# Patient Record
Sex: MALE | Race: WHITE | Employment: UNEMPLOYED | URBAN - METROPOLITAN AREA
[De-identification: names, ages, dates, MRNs, and addresses within clinical notes are randomized per-mention and may not be internally consistent; named-entity substitution may affect disease eponyms.]

---

## 2017-01-17 ENCOUNTER — OFFICE VISIT (OUTPATIENT)
Dept: FAMILY MEDICINE CLINIC | Age: 82
End: 2017-01-17

## 2017-01-17 VITALS
TEMPERATURE: 97.9 F | HEIGHT: 65 IN | DIASTOLIC BLOOD PRESSURE: 66 MMHG | WEIGHT: 134 LBS | SYSTOLIC BLOOD PRESSURE: 128 MMHG | BODY MASS INDEX: 22.33 KG/M2

## 2017-01-17 DIAGNOSIS — D50.8 IRON DEFICIENCY ANEMIA SECONDARY TO INADEQUATE DIETARY IRON INTAKE: ICD-10-CM

## 2017-01-17 DIAGNOSIS — R35.0 FREQUENCY OF URINATION: ICD-10-CM

## 2017-01-17 DIAGNOSIS — I10 ESSENTIAL HYPERTENSION: ICD-10-CM

## 2017-01-17 DIAGNOSIS — I10 ESSENTIAL HYPERTENSION: Primary | ICD-10-CM

## 2017-01-17 LAB
ANION GAP SERPL CALCULATED.3IONS-SCNC: 13 MMOL/L (ref 3–16)
BASOPHILS ABSOLUTE: 0 K/UL (ref 0–0.2)
BASOPHILS RELATIVE PERCENT: 1.3 %
BILIRUBIN, POC: NEGATIVE
BLOOD URINE, POC: ABNORMAL
BUN BLDV-MCNC: 39 MG/DL (ref 7–20)
CALCIUM SERPL-MCNC: 8.7 MG/DL (ref 8.3–10.6)
CHLORIDE BLD-SCNC: 107 MMOL/L (ref 99–110)
CLARITY, POC: CLEAR
CO2: 27 MMOL/L (ref 21–32)
COLOR, POC: YELLOW
CREAT SERPL-MCNC: 1.8 MG/DL (ref 0.8–1.3)
EOSINOPHILS ABSOLUTE: 0 K/UL (ref 0–0.6)
EOSINOPHILS RELATIVE PERCENT: 0.8 %
GFR AFRICAN AMERICAN: 42
GFR NON-AFRICAN AMERICAN: 35
GLUCOSE BLD-MCNC: 93 MG/DL (ref 70–99)
GLUCOSE URINE, POC: NEGATIVE
HCT VFR BLD CALC: 31.5 % (ref 40.5–52.5)
HEMOGLOBIN: 10.3 G/DL (ref 13.5–17.5)
KETONES, POC: NEGATIVE
LEUKOCYTE EST, POC: NEGATIVE
LYMPHOCYTES ABSOLUTE: 0.6 K/UL (ref 1–5.1)
LYMPHOCYTES RELATIVE PERCENT: 17.7 %
MCH RBC QN AUTO: 30.8 PG (ref 26–34)
MCHC RBC AUTO-ENTMCNC: 32.6 G/DL (ref 31–36)
MCV RBC AUTO: 94.4 FL (ref 80–100)
MONOCYTES ABSOLUTE: 0.2 K/UL (ref 0–1.3)
MONOCYTES RELATIVE PERCENT: 6.7 %
NEUTROPHILS ABSOLUTE: 2.7 K/UL (ref 1.7–7.7)
NEUTROPHILS RELATIVE PERCENT: 73.5 %
NITRITE, POC: NEGATIVE
PDW BLD-RTO: 15.4 % (ref 12.4–15.4)
PH, POC: 6.5
PLATELET # BLD: 139 K/UL (ref 135–450)
PMV BLD AUTO: 10.1 FL (ref 5–10.5)
POTASSIUM SERPL-SCNC: 4.6 MMOL/L (ref 3.5–5.1)
PROTEIN, POC: ABNORMAL
RBC # BLD: 3.33 M/UL (ref 4.2–5.9)
SODIUM BLD-SCNC: 147 MMOL/L (ref 136–145)
SPECIFIC GRAVITY, POC: 1.02
UROBILINOGEN, POC: 0.2
WBC # BLD: 3.6 K/UL (ref 4–11)

## 2017-01-17 PROCEDURE — G8484 FLU IMMUNIZE NO ADMIN: HCPCS | Performed by: INTERNAL MEDICINE

## 2017-01-17 PROCEDURE — 99214 OFFICE O/P EST MOD 30 MIN: CPT | Performed by: INTERNAL MEDICINE

## 2017-01-17 PROCEDURE — 1123F ACP DISCUSS/DSCN MKR DOCD: CPT | Performed by: INTERNAL MEDICINE

## 2017-01-17 PROCEDURE — 4040F PNEUMOC VAC/ADMIN/RCVD: CPT | Performed by: INTERNAL MEDICINE

## 2017-01-17 PROCEDURE — G8598 ASA/ANTIPLAT THER USED: HCPCS | Performed by: INTERNAL MEDICINE

## 2017-01-17 PROCEDURE — 81002 URINALYSIS NONAUTO W/O SCOPE: CPT | Performed by: INTERNAL MEDICINE

## 2017-01-17 PROCEDURE — G8427 DOCREV CUR MEDS BY ELIG CLIN: HCPCS | Performed by: INTERNAL MEDICINE

## 2017-01-17 PROCEDURE — G8419 CALC BMI OUT NRM PARAM NOF/U: HCPCS | Performed by: INTERNAL MEDICINE

## 2017-01-17 PROCEDURE — 1036F TOBACCO NON-USER: CPT | Performed by: INTERNAL MEDICINE

## 2017-01-17 ASSESSMENT — ENCOUNTER SYMPTOMS
EYE REDNESS: 1
SHORTNESS OF BREATH: 0
DIARRHEA: 0
BLOOD IN STOOL: 0
WHEEZING: 0
APNEA: 0
COUGH: 0
CONSTIPATION: 0
ABDOMINAL PAIN: 0

## 2017-01-30 RX ORDER — TAMSULOSIN HYDROCHLORIDE 0.4 MG/1
CAPSULE ORAL
Qty: 90 CAPSULE | Refills: 0 | Status: SHIPPED | OUTPATIENT
Start: 2017-01-30 | End: 2017-05-01 | Stop reason: SDUPTHER

## 2017-01-30 RX ORDER — SIMVASTATIN 5 MG
TABLET ORAL
Qty: 90 TABLET | Refills: 0 | Status: SHIPPED | OUTPATIENT
Start: 2017-01-30 | End: 2017-05-01 | Stop reason: SDUPTHER

## 2017-02-03 ENCOUNTER — TELEPHONE (OUTPATIENT)
Dept: FAMILY MEDICINE CLINIC | Age: 82
End: 2017-02-03

## 2017-03-28 ENCOUNTER — TELEPHONE (OUTPATIENT)
Dept: FAMILY MEDICINE CLINIC | Age: 82
End: 2017-03-28

## 2017-04-07 ENCOUNTER — TELEPHONE (OUTPATIENT)
Dept: FAMILY MEDICINE CLINIC | Age: 82
End: 2017-04-07

## 2017-04-07 ENCOUNTER — OFFICE VISIT (OUTPATIENT)
Dept: FAMILY MEDICINE CLINIC | Age: 82
End: 2017-04-07

## 2017-04-07 ENCOUNTER — HOSPITAL ENCOUNTER (OUTPATIENT)
Dept: NON INVASIVE DIAGNOSTICS | Age: 82
Discharge: HOME | End: 2017-04-07
Attending: INTERNAL MEDICINE | Admitting: INTERNAL MEDICINE

## 2017-04-07 VITALS
SYSTOLIC BLOOD PRESSURE: 130 MMHG | WEIGHT: 134 LBS | DIASTOLIC BLOOD PRESSURE: 64 MMHG | HEIGHT: 65 IN | TEMPERATURE: 97.7 F | OXYGEN SATURATION: 97 % | BODY MASS INDEX: 22.33 KG/M2

## 2017-04-07 DIAGNOSIS — M54.50 ACUTE MIDLINE LOW BACK PAIN WITHOUT SCIATICA: ICD-10-CM

## 2017-04-07 DIAGNOSIS — I10 ESSENTIAL HYPERTENSION: Primary | ICD-10-CM

## 2017-04-07 DIAGNOSIS — E78.00 PURE HYPERCHOLESTEROLEMIA: ICD-10-CM

## 2017-04-07 PROBLEM — M54.9 BACK PAIN: Status: ACTIVE | Noted: 2017-04-07

## 2017-04-07 PROCEDURE — G8419 CALC BMI OUT NRM PARAM NOF/U: HCPCS | Performed by: INTERNAL MEDICINE

## 2017-04-07 PROCEDURE — G8427 DOCREV CUR MEDS BY ELIG CLIN: HCPCS | Performed by: INTERNAL MEDICINE

## 2017-04-07 PROCEDURE — 1036F TOBACCO NON-USER: CPT | Performed by: INTERNAL MEDICINE

## 2017-04-07 PROCEDURE — G8598 ASA/ANTIPLAT THER USED: HCPCS | Performed by: INTERNAL MEDICINE

## 2017-04-07 PROCEDURE — 99214 OFFICE O/P EST MOD 30 MIN: CPT | Performed by: INTERNAL MEDICINE

## 2017-04-07 PROCEDURE — 4040F PNEUMOC VAC/ADMIN/RCVD: CPT | Performed by: INTERNAL MEDICINE

## 2017-04-07 PROCEDURE — 1123F ACP DISCUSS/DSCN MKR DOCD: CPT | Performed by: INTERNAL MEDICINE

## 2017-04-07 ASSESSMENT — ENCOUNTER SYMPTOMS
CONSTIPATION: 0
COUGH: 0
ABDOMINAL PAIN: 0
SINUS PRESSURE: 0
BACK PAIN: 1
NAUSEA: 0
VOMITING: 0
DIARRHEA: 0
SHORTNESS OF BREATH: 0
APNEA: 0

## 2017-04-10 RX ORDER — TRAMADOL HYDROCHLORIDE 50 MG/1
50 TABLET ORAL EVERY 6 HOURS PRN
Qty: 24 TABLET | Refills: 0 | Status: SHIPPED | OUTPATIENT
Start: 2017-04-10 | End: 2017-04-20

## 2017-05-02 RX ORDER — SIMVASTATIN 5 MG
TABLET ORAL
Qty: 90 TABLET | Refills: 0 | Status: SHIPPED | OUTPATIENT
Start: 2017-05-02 | End: 2017-07-25 | Stop reason: SDUPTHER

## 2017-05-02 RX ORDER — TAMSULOSIN HYDROCHLORIDE 0.4 MG/1
CAPSULE ORAL
Qty: 90 CAPSULE | Refills: 0 | Status: SHIPPED | OUTPATIENT
Start: 2017-05-02 | End: 2017-07-31 | Stop reason: SDUPTHER

## 2017-07-25 RX ORDER — SIMVASTATIN 5 MG
TABLET ORAL
Qty: 90 TABLET | Refills: 0 | Status: SHIPPED | OUTPATIENT
Start: 2017-07-25 | End: 2017-10-20 | Stop reason: SDUPTHER

## 2017-07-31 RX ORDER — TAMSULOSIN HYDROCHLORIDE 0.4 MG/1
CAPSULE ORAL
Qty: 90 CAPSULE | Refills: 0 | Status: SHIPPED | OUTPATIENT
Start: 2017-07-31 | End: 2017-10-25 | Stop reason: SDUPTHER

## 2017-09-13 ENCOUNTER — OFFICE VISIT (OUTPATIENT)
Dept: FAMILY MEDICINE CLINIC | Age: 82
End: 2017-09-13

## 2017-09-13 VITALS
BODY MASS INDEX: 22.33 KG/M2 | SYSTOLIC BLOOD PRESSURE: 122 MMHG | HEIGHT: 65 IN | TEMPERATURE: 98.3 F | DIASTOLIC BLOOD PRESSURE: 72 MMHG | WEIGHT: 134 LBS

## 2017-09-13 DIAGNOSIS — Z23 FLU VACCINE NEED: ICD-10-CM

## 2017-09-13 DIAGNOSIS — M54.2 NECK PAIN: Primary | ICD-10-CM

## 2017-09-13 PROCEDURE — 4040F PNEUMOC VAC/ADMIN/RCVD: CPT | Performed by: INTERNAL MEDICINE

## 2017-09-13 PROCEDURE — G0008 ADMIN INFLUENZA VIRUS VAC: HCPCS | Performed by: INTERNAL MEDICINE

## 2017-09-13 PROCEDURE — 1123F ACP DISCUSS/DSCN MKR DOCD: CPT | Performed by: INTERNAL MEDICINE

## 2017-09-13 PROCEDURE — G8420 CALC BMI NORM PARAMETERS: HCPCS | Performed by: INTERNAL MEDICINE

## 2017-09-13 PROCEDURE — 90662 IIV NO PRSV INCREASED AG IM: CPT | Performed by: INTERNAL MEDICINE

## 2017-09-13 PROCEDURE — 99213 OFFICE O/P EST LOW 20 MIN: CPT | Performed by: INTERNAL MEDICINE

## 2017-09-13 PROCEDURE — G8427 DOCREV CUR MEDS BY ELIG CLIN: HCPCS | Performed by: INTERNAL MEDICINE

## 2017-09-13 PROCEDURE — G8598 ASA/ANTIPLAT THER USED: HCPCS | Performed by: INTERNAL MEDICINE

## 2017-09-13 PROCEDURE — 1036F TOBACCO NON-USER: CPT | Performed by: INTERNAL MEDICINE

## 2017-09-13 ASSESSMENT — PATIENT HEALTH QUESTIONNAIRE - PHQ9
1. LITTLE INTEREST OR PLEASURE IN DOING THINGS: 0
SUM OF ALL RESPONSES TO PHQ9 QUESTIONS 1 & 2: 0
2. FEELING DOWN, DEPRESSED OR HOPELESS: 0
SUM OF ALL RESPONSES TO PHQ QUESTIONS 1-9: 0

## 2017-09-13 ASSESSMENT — ENCOUNTER SYMPTOMS
SHORTNESS OF BREATH: 0
WHEEZING: 0
APNEA: 0
ABDOMINAL PAIN: 0
COUGH: 0

## 2017-10-23 RX ORDER — SIMVASTATIN 5 MG
TABLET ORAL
Qty: 90 TABLET | Refills: 0 | Status: SHIPPED | OUTPATIENT
Start: 2017-10-23 | End: 2018-01-29 | Stop reason: SDUPTHER

## 2017-10-27 RX ORDER — TAMSULOSIN HYDROCHLORIDE 0.4 MG/1
CAPSULE ORAL
Qty: 90 CAPSULE | Refills: 0 | Status: SHIPPED | OUTPATIENT
Start: 2017-10-27 | End: 2018-01-29 | Stop reason: SDUPTHER

## 2018-01-30 RX ORDER — SIMVASTATIN 5 MG
TABLET ORAL
Qty: 90 TABLET | Refills: 0 | Status: SHIPPED | OUTPATIENT
Start: 2018-01-30 | End: 2018-04-27 | Stop reason: SDUPTHER

## 2018-01-30 RX ORDER — TAMSULOSIN HYDROCHLORIDE 0.4 MG/1
CAPSULE ORAL
Qty: 90 CAPSULE | Refills: 0 | Status: SHIPPED | OUTPATIENT
Start: 2018-01-30 | End: 2018-04-27 | Stop reason: SDUPTHER

## 2018-04-27 RX ORDER — TAMSULOSIN HYDROCHLORIDE 0.4 MG/1
CAPSULE ORAL
Qty: 90 CAPSULE | Refills: 0 | Status: SHIPPED | OUTPATIENT
Start: 2018-04-27 | End: 2018-07-26 | Stop reason: SDUPTHER

## 2018-04-30 RX ORDER — SIMVASTATIN 5 MG
TABLET ORAL
Qty: 90 TABLET | Refills: 0 | Status: SHIPPED | OUTPATIENT
Start: 2018-04-30 | End: 2018-07-26 | Stop reason: SDUPTHER

## 2018-07-27 RX ORDER — TAMSULOSIN HYDROCHLORIDE 0.4 MG/1
CAPSULE ORAL
Qty: 90 CAPSULE | Refills: 0 | Status: SHIPPED | OUTPATIENT
Start: 2018-07-27 | End: 2018-10-22 | Stop reason: SDUPTHER

## 2018-07-27 RX ORDER — TAMSULOSIN HYDROCHLORIDE 0.4 MG/1
CAPSULE ORAL
Qty: 90 CAPSULE | Refills: 0 | Status: SHIPPED | OUTPATIENT
Start: 2018-07-27 | End: 2018-08-03 | Stop reason: SDUPTHER

## 2018-07-27 RX ORDER — SIMVASTATIN 5 MG
TABLET ORAL
Qty: 90 TABLET | Refills: 0 | Status: ON HOLD | OUTPATIENT
Start: 2018-07-27 | End: 2019-06-04

## 2018-08-03 ENCOUNTER — OFFICE VISIT (OUTPATIENT)
Dept: FAMILY MEDICINE CLINIC | Age: 83
End: 2018-08-03

## 2018-08-03 VITALS
SYSTOLIC BLOOD PRESSURE: 130 MMHG | HEIGHT: 65 IN | BODY MASS INDEX: 23.32 KG/M2 | TEMPERATURE: 97 F | WEIGHT: 140 LBS | DIASTOLIC BLOOD PRESSURE: 80 MMHG

## 2018-08-03 DIAGNOSIS — J18.9 PNEUMONIA DUE TO INFECTIOUS ORGANISM, UNSPECIFIED LATERALITY, UNSPECIFIED PART OF LUNG: ICD-10-CM

## 2018-08-03 PROCEDURE — G8598 ASA/ANTIPLAT THER USED: HCPCS | Performed by: INTERNAL MEDICINE

## 2018-08-03 PROCEDURE — G8420 CALC BMI NORM PARAMETERS: HCPCS | Performed by: INTERNAL MEDICINE

## 2018-08-03 PROCEDURE — 1036F TOBACCO NON-USER: CPT | Performed by: INTERNAL MEDICINE

## 2018-08-03 PROCEDURE — 1101F PT FALLS ASSESS-DOCD LE1/YR: CPT | Performed by: INTERNAL MEDICINE

## 2018-08-03 PROCEDURE — 99213 OFFICE O/P EST LOW 20 MIN: CPT | Performed by: INTERNAL MEDICINE

## 2018-08-03 PROCEDURE — 4040F PNEUMOC VAC/ADMIN/RCVD: CPT | Performed by: INTERNAL MEDICINE

## 2018-08-03 PROCEDURE — G8427 DOCREV CUR MEDS BY ELIG CLIN: HCPCS | Performed by: INTERNAL MEDICINE

## 2018-08-03 PROCEDURE — 1123F ACP DISCUSS/DSCN MKR DOCD: CPT | Performed by: INTERNAL MEDICINE

## 2018-08-03 RX ORDER — AMOXICILLIN AND CLAVULANATE POTASSIUM 500; 125 MG/1; MG/1
1 TABLET, FILM COATED ORAL 3 TIMES DAILY
COMMUNITY
End: 2019-01-08 | Stop reason: ALTCHOICE

## 2018-08-03 ASSESSMENT — ENCOUNTER SYMPTOMS
SINUS PRESSURE: 0
APNEA: 0
COUGH: 0
RHINORRHEA: 0
ABDOMINAL PAIN: 0
SHORTNESS OF BREATH: 0
SINUS PAIN: 0

## 2018-08-03 NOTE — PATIENT INSTRUCTIONS
Please call your pharmacy if you need any refills of your medication(s). Please call our office at [phone] if you don't hear from us about your test results. Please be sure to call our office if your illness/problem has been treated for but has not completely resolved. Bring an accurate list of your medications with you at every appointment to ensure that we have the correct information. Our office hours are: Monday - Friday 7 am- 5 pm; Saturdays vary on doctor working.     Phone lines turn on at 8 am.

## 2018-08-30 ENCOUNTER — TELEPHONE (OUTPATIENT)
Dept: FAMILY MEDICINE CLINIC | Age: 83
End: 2018-08-30

## 2018-08-30 DIAGNOSIS — J18.9 PNEUMONIA DUE TO INFECTIOUS ORGANISM, UNSPECIFIED LATERALITY, UNSPECIFIED PART OF LUNG: Primary | ICD-10-CM

## 2018-10-22 RX ORDER — TAMSULOSIN HYDROCHLORIDE 0.4 MG/1
CAPSULE ORAL
Qty: 90 CAPSULE | Refills: 0 | Status: SHIPPED | OUTPATIENT
Start: 2018-10-22

## 2019-01-08 ENCOUNTER — OFFICE VISIT (OUTPATIENT)
Dept: FAMILY MEDICINE CLINIC | Age: 84
End: 2019-01-08
Payer: MEDICARE

## 2019-01-08 VITALS
SYSTOLIC BLOOD PRESSURE: 120 MMHG | TEMPERATURE: 98 F | HEIGHT: 65 IN | BODY MASS INDEX: 20.83 KG/M2 | WEIGHT: 125 LBS | DIASTOLIC BLOOD PRESSURE: 64 MMHG

## 2019-01-08 DIAGNOSIS — B96.89 ACUTE BACTERIAL SINUSITIS: ICD-10-CM

## 2019-01-08 DIAGNOSIS — J01.90 ACUTE BACTERIAL SINUSITIS: ICD-10-CM

## 2019-01-08 PROCEDURE — G8598 ASA/ANTIPLAT THER USED: HCPCS | Performed by: INTERNAL MEDICINE

## 2019-01-08 PROCEDURE — 1123F ACP DISCUSS/DSCN MKR DOCD: CPT | Performed by: INTERNAL MEDICINE

## 2019-01-08 PROCEDURE — 1036F TOBACCO NON-USER: CPT | Performed by: INTERNAL MEDICINE

## 2019-01-08 PROCEDURE — 4040F PNEUMOC VAC/ADMIN/RCVD: CPT | Performed by: INTERNAL MEDICINE

## 2019-01-08 PROCEDURE — G8427 DOCREV CUR MEDS BY ELIG CLIN: HCPCS | Performed by: INTERNAL MEDICINE

## 2019-01-08 PROCEDURE — G8484 FLU IMMUNIZE NO ADMIN: HCPCS | Performed by: INTERNAL MEDICINE

## 2019-01-08 PROCEDURE — G8420 CALC BMI NORM PARAMETERS: HCPCS | Performed by: INTERNAL MEDICINE

## 2019-01-08 PROCEDURE — 1101F PT FALLS ASSESS-DOCD LE1/YR: CPT | Performed by: INTERNAL MEDICINE

## 2019-01-08 PROCEDURE — 99213 OFFICE O/P EST LOW 20 MIN: CPT | Performed by: INTERNAL MEDICINE

## 2019-01-08 RX ORDER — CEFUROXIME AXETIL 250 MG/1
250 TABLET ORAL 2 TIMES DAILY
Qty: 20 TABLET | Refills: 0 | Status: SHIPPED | OUTPATIENT
Start: 2019-01-08 | End: 2019-01-18

## 2019-01-08 ASSESSMENT — ENCOUNTER SYMPTOMS
WHEEZING: 1
ABDOMINAL PAIN: 0
RHINORRHEA: 1
SINUS PRESSURE: 1
COUGH: 1
SINUS PAIN: 1
SHORTNESS OF BREATH: 0

## 2019-01-08 ASSESSMENT — PATIENT HEALTH QUESTIONNAIRE - PHQ9
2. FEELING DOWN, DEPRESSED OR HOPELESS: 0
1. LITTLE INTEREST OR PLEASURE IN DOING THINGS: 0
SUM OF ALL RESPONSES TO PHQ9 QUESTIONS 1 & 2: 0
SUM OF ALL RESPONSES TO PHQ QUESTIONS 1-9: 0
SUM OF ALL RESPONSES TO PHQ QUESTIONS 1-9: 0

## 2019-01-14 RX ORDER — TAMSULOSIN HYDROCHLORIDE 0.4 MG/1
CAPSULE ORAL
Qty: 90 CAPSULE | Refills: 0 | Status: SHIPPED | OUTPATIENT
Start: 2019-01-14 | End: 2019-04-11 | Stop reason: SDUPTHER

## 2019-04-12 RX ORDER — TAMSULOSIN HYDROCHLORIDE 0.4 MG/1
CAPSULE ORAL
Qty: 90 CAPSULE | Refills: 0 | Status: SHIPPED | OUTPATIENT
Start: 2019-04-12 | End: 2019-04-25 | Stop reason: SDUPTHER

## 2019-04-25 ENCOUNTER — OFFICE VISIT (OUTPATIENT)
Dept: FAMILY MEDICINE CLINIC | Age: 84
End: 2019-04-25
Payer: MEDICARE

## 2019-04-25 VITALS
DIASTOLIC BLOOD PRESSURE: 60 MMHG | HEIGHT: 65 IN | TEMPERATURE: 98.4 F | BODY MASS INDEX: 20.8 KG/M2 | SYSTOLIC BLOOD PRESSURE: 126 MMHG

## 2019-04-25 DIAGNOSIS — J06.9 ACUTE UPPER RESPIRATORY INFECTION, UNSPECIFIED: Primary | ICD-10-CM

## 2019-04-25 PROCEDURE — 4040F PNEUMOC VAC/ADMIN/RCVD: CPT | Performed by: FAMILY MEDICINE

## 2019-04-25 PROCEDURE — G8420 CALC BMI NORM PARAMETERS: HCPCS | Performed by: FAMILY MEDICINE

## 2019-04-25 PROCEDURE — G8598 ASA/ANTIPLAT THER USED: HCPCS | Performed by: FAMILY MEDICINE

## 2019-04-25 PROCEDURE — 1123F ACP DISCUSS/DSCN MKR DOCD: CPT | Performed by: FAMILY MEDICINE

## 2019-04-25 PROCEDURE — G8427 DOCREV CUR MEDS BY ELIG CLIN: HCPCS | Performed by: FAMILY MEDICINE

## 2019-04-25 PROCEDURE — 99213 OFFICE O/P EST LOW 20 MIN: CPT | Performed by: FAMILY MEDICINE

## 2019-04-25 PROCEDURE — 1036F TOBACCO NON-USER: CPT | Performed by: FAMILY MEDICINE

## 2019-04-25 RX ORDER — LEVOFLOXACIN 250 MG/1
250 TABLET ORAL DAILY
Qty: 10 TABLET | Refills: 0 | Status: SHIPPED | OUTPATIENT
Start: 2019-04-25 | End: 2019-05-05

## 2019-04-25 NOTE — PROGRESS NOTES
Subjective:      Patient ID: Lupe Romero is a 80 y.o. male. Patient presents with:  Congestion: cough - yellow phlegm, rattling x 5 days    Cough productive above   No sore throat  No fever  No one else sick  Lives with family    YOB: 1919    Date of Visit:  4/25/2019    No Known Allergies    Current Outpatient Medications:  albuterol-ipratropium (COMBIVENT RESPIMAT)  MCG/ACT AERS inhaler, Inhale 1 puff into the lungs every 6 hours, Disp: 1 Inhaler, Rfl: 5  tamsulosin (FLOMAX) 0.4 MG capsule, TAKE 1 CAPSULE BY MOUTH EVERY DAY, Disp: 90 capsule, Rfl: 0  simvastatin (ZOCOR) 5 MG tablet, TAKE 1 TABLET BY MOUTH EVERY EVENING, Disp: 90 tablet, Rfl: 0  aspirin 81 MG tablet, Take 2 by mouth daily, Disp: , Rfl:   atenolol (TENORMIN) 25 MG tablet, Take 25 mg by mouth daily. , Disp: , Rfl:   bimatoprost (LUMIGAN) 0.03 % ophthalmic drops, 1 drop nightly.  , Disp: , Rfl:   Multiple Vitamin (MULTIVITAMIN PO), Take  by mouth daily. , Disp: , Rfl:   nitroGLYCERIN (NITROSTAT) 0.4 MG SL tablet, Place 0.4 mg under the tongue every 5 minutes as needed. , Disp: , Rfl:     No current facility-administered medications for this visit.       ---------------------------               04/25/19                      1458         ---------------------------   BP:          126/60         Site:    Left Upper Arm     Position:     Sitting        Cuff Size:   Large Adult      Temp:   98.4 °F (36.9 °C)   TempSrc:      Oral          Height:  5' 5\" (1.651 m)   ---------------------------  Body mass index is 20.8 kg/m². Wt Readings from Last 3 Encounters:  01/08/19 : 125 lb (56.7 kg)  08/03/18 : 140 lb (63.5 kg)  09/13/17 : 134 lb (60.8 kg)    BP Readings from Last 3 Encounters:  04/25/19 : 126/60  01/08/19 : 120/64  08/03/18 : 130/80            Review of Systems    Objective:   Physical Exam   Constitutional: He appears well-developed and well-nourished. No distress.    HENT:   Head:

## 2019-04-25 NOTE — PATIENT INSTRUCTIONS
Use the antibiotic  Call if not better  If he is feeling very well at 7 days then no need to take the full 10 days

## 2019-06-04 ENCOUNTER — HOSPITAL ENCOUNTER (INPATIENT)
Age: 84
LOS: 2 days | Discharge: HOSPICE | DRG: 436 | End: 2019-06-06
Attending: EMERGENCY MEDICINE | Admitting: INTERNAL MEDICINE
Payer: MEDICARE

## 2019-06-04 ENCOUNTER — APPOINTMENT (OUTPATIENT)
Dept: CT IMAGING | Age: 84
DRG: 436 | End: 2019-06-04
Payer: MEDICARE

## 2019-06-04 DIAGNOSIS — R11.2 NAUSEA VOMITING AND DIARRHEA: Primary | ICD-10-CM

## 2019-06-04 DIAGNOSIS — R19.7 NAUSEA VOMITING AND DIARRHEA: Primary | ICD-10-CM

## 2019-06-04 DIAGNOSIS — Z51.5 HOSPICE CARE: ICD-10-CM

## 2019-06-04 PROBLEM — N17.9 ACUTE RENAL FAILURE SUPERIMPOSED ON STAGE 3 CHRONIC KIDNEY DISEASE (HCC): Status: ACTIVE | Noted: 2019-06-04

## 2019-06-04 PROBLEM — K63.9 BOWEL WALL THICKENING: Status: ACTIVE | Noted: 2019-06-04

## 2019-06-04 PROBLEM — N18.30 ACUTE RENAL FAILURE SUPERIMPOSED ON STAGE 3 CHRONIC KIDNEY DISEASE (HCC): Status: ACTIVE | Noted: 2019-06-04

## 2019-06-04 PROBLEM — R77.8 ELEVATED TROPONIN: Status: ACTIVE | Noted: 2019-06-04

## 2019-06-04 PROBLEM — K83.8 PNEUMOBILIA: Status: ACTIVE | Noted: 2019-06-04

## 2019-06-04 PROBLEM — R19.00 ABDOMINAL MASS: Status: ACTIVE | Noted: 2019-06-04

## 2019-06-04 PROBLEM — R79.89 ELEVATED LACTIC ACID LEVEL: Status: ACTIVE | Noted: 2019-06-04

## 2019-06-04 LAB
A/G RATIO: 1 (ref 1.1–2.2)
ALBUMIN SERPL-MCNC: 3.4 G/DL (ref 3.4–5)
ALP BLD-CCNC: 67 U/L (ref 40–129)
ALT SERPL-CCNC: 15 U/L (ref 10–40)
ANION GAP SERPL CALCULATED.3IONS-SCNC: 16 MMOL/L (ref 3–16)
APTT: 32.6 SEC (ref 26–36)
AST SERPL-CCNC: 21 U/L (ref 15–37)
BASOPHILS ABSOLUTE: 0 K/UL (ref 0–0.2)
BASOPHILS RELATIVE PERCENT: 0.6 %
BILIRUB SERPL-MCNC: 0.8 MG/DL (ref 0–1)
BILIRUBIN URINE: NEGATIVE
BLOOD, URINE: NEGATIVE
BUN BLDV-MCNC: 35 MG/DL (ref 7–20)
CALCIUM SERPL-MCNC: 9 MG/DL (ref 8.3–10.6)
CHLORIDE BLD-SCNC: 105 MMOL/L (ref 99–110)
CLARITY: CLEAR
CO2: 21 MMOL/L (ref 21–32)
COLOR: YELLOW
CREAT SERPL-MCNC: 2 MG/DL (ref 0.8–1.3)
EKG ATRIAL RATE: 65 BPM
EKG DIAGNOSIS: NORMAL
EKG P AXIS: 73 DEGREES
EKG P-R INTERVAL: 148 MS
EKG Q-T INTERVAL: 462 MS
EKG QRS DURATION: 120 MS
EKG QTC CALCULATION (BAZETT): 480 MS
EKG R AXIS: -23 DEGREES
EKG T AXIS: -17 DEGREES
EKG VENTRICULAR RATE: 65 BPM
EOSINOPHILS ABSOLUTE: 0 K/UL (ref 0–0.6)
EOSINOPHILS RELATIVE PERCENT: 0.9 %
EPITHELIAL CELLS, UA: 0 /HPF (ref 0–5)
GFR AFRICAN AMERICAN: 37
GFR NON-AFRICAN AMERICAN: 31
GLOBULIN: 3.4 G/DL
GLUCOSE BLD-MCNC: 174 MG/DL (ref 70–99)
GLUCOSE URINE: NEGATIVE MG/DL
HCT VFR BLD CALC: 29.9 % (ref 40.5–52.5)
HEMOGLOBIN: 9.7 G/DL (ref 13.5–17.5)
HYALINE CASTS: 2 /LPF (ref 0–8)
INR BLD: 1.04 (ref 0.86–1.14)
KETONES, URINE: NEGATIVE MG/DL
LACTIC ACID: 2.9 MMOL/L (ref 0.4–2)
LEUKOCYTE ESTERASE, URINE: NEGATIVE
LIPASE: 13 U/L (ref 13–60)
LYMPHOCYTES ABSOLUTE: 0.4 K/UL (ref 1–5.1)
LYMPHOCYTES RELATIVE PERCENT: 9.4 %
MCH RBC QN AUTO: 30.2 PG (ref 26–34)
MCHC RBC AUTO-ENTMCNC: 32.3 G/DL (ref 31–36)
MCV RBC AUTO: 93.5 FL (ref 80–100)
MICROSCOPIC EXAMINATION: YES
MONOCYTES ABSOLUTE: 0.3 K/UL (ref 0–1.3)
MONOCYTES RELATIVE PERCENT: 7.7 %
NEUTROPHILS ABSOLUTE: 3.5 K/UL (ref 1.7–7.7)
NEUTROPHILS RELATIVE PERCENT: 81.4 %
NITRITE, URINE: NEGATIVE
PDW BLD-RTO: 17.2 % (ref 12.4–15.4)
PH UA: 5.5 (ref 5–8)
PLATELET # BLD: 123 K/UL (ref 135–450)
PMV BLD AUTO: 8.8 FL (ref 5–10.5)
POTASSIUM SERPL-SCNC: 4.3 MMOL/L (ref 3.5–5.1)
PROCALCITONIN: 0.1 NG/ML (ref 0–0.15)
PROTEIN UA: ABNORMAL MG/DL
PROTHROMBIN TIME: 11.8 SEC (ref 9.8–13)
RBC # BLD: 3.19 M/UL (ref 4.2–5.9)
RBC UA: 2 /HPF (ref 0–4)
SODIUM BLD-SCNC: 142 MMOL/L (ref 136–145)
SPECIFIC GRAVITY UA: 1.02 (ref 1–1.03)
TOTAL PROTEIN: 6.8 G/DL (ref 6.4–8.2)
TROPONIN: 0.04 NG/ML
URINE REFLEX TO CULTURE: ABNORMAL
URINE TYPE: ABNORMAL
UROBILINOGEN, URINE: 0.2 E.U./DL
WBC # BLD: 4.3 K/UL (ref 4–11)
WBC UA: 1 /HPF (ref 0–5)

## 2019-06-04 PROCEDURE — 83605 ASSAY OF LACTIC ACID: CPT

## 2019-06-04 PROCEDURE — 84145 PROCALCITONIN (PCT): CPT

## 2019-06-04 PROCEDURE — 81001 URINALYSIS AUTO W/SCOPE: CPT

## 2019-06-04 PROCEDURE — 86301 IMMUNOASSAY TUMOR CA 19-9: CPT

## 2019-06-04 PROCEDURE — 82378 CARCINOEMBRYONIC ANTIGEN: CPT

## 2019-06-04 PROCEDURE — 96360 HYDRATION IV INFUSION INIT: CPT

## 2019-06-04 PROCEDURE — 94640 AIRWAY INHALATION TREATMENT: CPT

## 2019-06-04 PROCEDURE — 85610 PROTHROMBIN TIME: CPT

## 2019-06-04 PROCEDURE — 85025 COMPLETE CBC W/AUTO DIFF WBC: CPT

## 2019-06-04 PROCEDURE — 36415 COLL VENOUS BLD VENIPUNCTURE: CPT

## 2019-06-04 PROCEDURE — 93005 ELECTROCARDIOGRAM TRACING: CPT | Performed by: EMERGENCY MEDICINE

## 2019-06-04 PROCEDURE — 93010 ELECTROCARDIOGRAM REPORT: CPT | Performed by: INTERNAL MEDICINE

## 2019-06-04 PROCEDURE — 2580000003 HC RX 258: Performed by: INTERNAL MEDICINE

## 2019-06-04 PROCEDURE — 1200000000 HC SEMI PRIVATE

## 2019-06-04 PROCEDURE — 84484 ASSAY OF TROPONIN QUANT: CPT

## 2019-06-04 PROCEDURE — 94761 N-INVAS EAR/PLS OXIMETRY MLT: CPT

## 2019-06-04 PROCEDURE — 6370000000 HC RX 637 (ALT 250 FOR IP): Performed by: INTERNAL MEDICINE

## 2019-06-04 PROCEDURE — 85730 THROMBOPLASTIN TIME PARTIAL: CPT

## 2019-06-04 PROCEDURE — 80053 COMPREHEN METABOLIC PANEL: CPT

## 2019-06-04 PROCEDURE — 99285 EMERGENCY DEPT VISIT HI MDM: CPT

## 2019-06-04 PROCEDURE — 83690 ASSAY OF LIPASE: CPT

## 2019-06-04 PROCEDURE — 74176 CT ABD & PELVIS W/O CONTRAST: CPT

## 2019-06-04 PROCEDURE — 2580000003 HC RX 258: Performed by: EMERGENCY MEDICINE

## 2019-06-04 PROCEDURE — 96361 HYDRATE IV INFUSION ADD-ON: CPT

## 2019-06-04 RX ORDER — SODIUM CHLORIDE 0.9 % (FLUSH) 0.9 %
10 SYRINGE (ML) INJECTION EVERY 12 HOURS SCHEDULED
Status: DISCONTINUED | OUTPATIENT
Start: 2019-06-04 | End: 2019-06-06 | Stop reason: HOSPADM

## 2019-06-04 RX ORDER — ATENOLOL 25 MG/1
25 TABLET ORAL DAILY
Status: DISCONTINUED | OUTPATIENT
Start: 2019-06-05 | End: 2019-06-06 | Stop reason: HOSPADM

## 2019-06-04 RX ORDER — ONDANSETRON 2 MG/ML
4 INJECTION INTRAMUSCULAR; INTRAVENOUS EVERY 4 HOURS PRN
Status: DISCONTINUED | OUTPATIENT
Start: 2019-06-04 | End: 2019-06-06 | Stop reason: HOSPADM

## 2019-06-04 RX ORDER — FAMOTIDINE 20 MG/1
20 TABLET, FILM COATED ORAL 2 TIMES DAILY
Status: DISCONTINUED | OUTPATIENT
Start: 2019-06-04 | End: 2019-06-04 | Stop reason: DRUGHIGH

## 2019-06-04 RX ORDER — MAGNESIUM HYDROXIDE/ALUMINUM HYDROXICE/SIMETHICONE 120; 1200; 1200 MG/30ML; MG/30ML; MG/30ML
30 SUSPENSION ORAL EVERY 6 HOURS PRN
Status: DISCONTINUED | OUTPATIENT
Start: 2019-06-04 | End: 2019-06-06 | Stop reason: HOSPADM

## 2019-06-04 RX ORDER — TAMSULOSIN HYDROCHLORIDE 0.4 MG/1
0.4 CAPSULE ORAL DAILY
Status: DISCONTINUED | OUTPATIENT
Start: 2019-06-05 | End: 2019-06-06 | Stop reason: HOSPADM

## 2019-06-04 RX ORDER — SODIUM CHLORIDE 9 MG/ML
INJECTION, SOLUTION INTRAVENOUS CONTINUOUS
Status: ACTIVE | OUTPATIENT
Start: 2019-06-04 | End: 2019-06-05

## 2019-06-04 RX ORDER — LATANOPROST 50 UG/ML
1 SOLUTION/ DROPS OPHTHALMIC NIGHTLY
COMMUNITY

## 2019-06-04 RX ORDER — 0.9 % SODIUM CHLORIDE 0.9 %
500 INTRAVENOUS SOLUTION INTRAVENOUS ONCE
Status: COMPLETED | OUTPATIENT
Start: 2019-06-04 | End: 2019-06-04

## 2019-06-04 RX ORDER — ACETAMINOPHEN 325 MG/1
650 TABLET ORAL EVERY 4 HOURS PRN
Status: DISCONTINUED | OUTPATIENT
Start: 2019-06-04 | End: 2019-06-06 | Stop reason: HOSPADM

## 2019-06-04 RX ORDER — SODIUM CHLORIDE 0.9 % (FLUSH) 0.9 %
10 SYRINGE (ML) INJECTION PRN
Status: DISCONTINUED | OUTPATIENT
Start: 2019-06-04 | End: 2019-06-06 | Stop reason: HOSPADM

## 2019-06-04 RX ORDER — LATANOPROST 50 UG/ML
1 SOLUTION/ DROPS OPHTHALMIC NIGHTLY
Status: DISCONTINUED | OUTPATIENT
Start: 2019-06-04 | End: 2019-06-06 | Stop reason: HOSPADM

## 2019-06-04 RX ORDER — ALBUTEROL SULFATE 90 UG/1
2 AEROSOL, METERED RESPIRATORY (INHALATION) EVERY 6 HOURS
Status: DISCONTINUED | OUTPATIENT
Start: 2019-06-04 | End: 2019-06-06 | Stop reason: HOSPADM

## 2019-06-04 RX ORDER — FAMOTIDINE 20 MG/1
20 TABLET, FILM COATED ORAL DAILY
Status: DISCONTINUED | OUTPATIENT
Start: 2019-06-05 | End: 2019-06-06 | Stop reason: HOSPADM

## 2019-06-04 RX ORDER — SIMVASTATIN 10 MG
5 TABLET ORAL NIGHTLY
Status: DISCONTINUED | OUTPATIENT
Start: 2019-06-04 | End: 2019-06-04

## 2019-06-04 RX ADMIN — SODIUM CHLORIDE: 9 INJECTION, SOLUTION INTRAVENOUS at 23:39

## 2019-06-04 RX ADMIN — ALBUTEROL SULFATE 2 PUFF: 90 AEROSOL, METERED RESPIRATORY (INHALATION) at 21:37

## 2019-06-04 RX ADMIN — SODIUM CHLORIDE 500 ML: 9 INJECTION, SOLUTION INTRAVENOUS at 15:58

## 2019-06-04 RX ADMIN — IPRATROPIUM BROMIDE 2 PUFF: 17 AEROSOL, METERED RESPIRATORY (INHALATION) at 21:37

## 2019-06-04 RX ADMIN — SODIUM CHLORIDE, PRESERVATIVE FREE 10 ML: 5 INJECTION INTRAVENOUS at 23:32

## 2019-06-04 ASSESSMENT — PAIN DESCRIPTION - LOCATION: LOCATION: ABDOMEN

## 2019-06-04 ASSESSMENT — PAIN DESCRIPTION - ONSET: ONSET: ON-GOING

## 2019-06-04 ASSESSMENT — PAIN DESCRIPTION - ORIENTATION: ORIENTATION: RIGHT

## 2019-06-04 ASSESSMENT — PAIN - FUNCTIONAL ASSESSMENT: PAIN_FUNCTIONAL_ASSESSMENT: PREVENTS OR INTERFERES SOME ACTIVE ACTIVITIES AND ADLS

## 2019-06-04 ASSESSMENT — PAIN DESCRIPTION - PROGRESSION: CLINICAL_PROGRESSION: NOT CHANGED

## 2019-06-04 ASSESSMENT — PAIN DESCRIPTION - DESCRIPTORS: DESCRIPTORS: ACHING

## 2019-06-04 ASSESSMENT — PAIN DESCRIPTION - FREQUENCY: FREQUENCY: INTERMITTENT

## 2019-06-04 ASSESSMENT — PAIN SCALES - GENERAL
PAINLEVEL_OUTOF10: 0
PAINLEVEL_OUTOF10: 4

## 2019-06-04 ASSESSMENT — PAIN DESCRIPTION - PAIN TYPE: TYPE: ACUTE PAIN

## 2019-06-04 NOTE — ED NOTES
Bed: S-48  Expected date:   Expected time:   Means of arrival:   Comments:  Toy Willis RN  06/04/19 7664

## 2019-06-04 NOTE — ED PROVIDER NOTES
Attends Scientologist service: Not on file     Active member of club or organization: Not on file     Attends meetings of clubs or organizations: Not on file     Relationship status: Not on file    Intimate partner violence:     Fear of current or ex partner: Not on file     Emotionally abused: Not on file     Physically abused: Not on file     Forced sexual activity: Not on file   Other Topics Concern    Not on file   Social History Narrative    Not on file     Current Facility-Administered Medications   Medication Dose Route Frequency Provider Last Rate Last Dose    0.9 % sodium chloride bolus  500 mL Intravenous Once Pankaj Proctor MD         Current Outpatient Medications   Medication Sig Dispense Refill    albuterol-ipratropium (COMBIVENT RESPIMAT)  MCG/ACT AERS inhaler Inhale 1 puff into the lungs every 6 hours 1 Inhaler 5    tamsulosin (FLOMAX) 0.4 MG capsule TAKE 1 CAPSULE BY MOUTH EVERY DAY 90 capsule 0    simvastatin (ZOCOR) 5 MG tablet TAKE 1 TABLET BY MOUTH EVERY EVENING 90 tablet 0    aspirin 81 MG tablet Take 2 by mouth daily      atenolol (TENORMIN) 25 MG tablet Take 25 mg by mouth daily.  bimatoprost (LUMIGAN) 0.03 % ophthalmic drops 1 drop nightly.  Multiple Vitamin (MULTIVITAMIN PO) Take  by mouth daily.  nitroGLYCERIN (NITROSTAT) 0.4 MG SL tablet Place 0.4 mg under the tongue every 5 minutes as needed. No Known Allergies    [unfilled]    Nursing Notes Reviewed    Physical Exam:  Vitals:    06/04/19 1357   BP: (!) 142/66   Pulse:    Resp:    Temp:    SpO2:        GENERAL APPEARANCE: Awake and alert. Cooperative. No acute distress. HEAD: Normocephalic. Atraumatic. EYES: EOM's grossly intact. Sclera anicteric. ENT: Mucous membranes are moist. Tolerates saliva. No trismus. NECK: Supple. No meningismus. Trachea midline. HEART: RRR. Radial pulses 2+. LUNGS: Respirations unlabored. CTAB  ABDOMEN: Soft. Non-tender. No guarding or rebound.   EXTREMITIES: No acute deformities. SKIN: Warm and dry. NEUROLOGICAL: No gross facial drooping. Moves all 4 extremities spontaneously. PSYCHIATRIC: Normal mood.     I have reviewed and interpreted all of the currently available lab results from this visit (if applicable):  Results for orders placed or performed during the hospital encounter of 06/04/19   CBC Auto Differential   Result Value Ref Range    WBC 4.3 4.0 - 11.0 K/uL    RBC 3.19 (L) 4.20 - 5.90 M/uL    Hemoglobin 9.7 (L) 13.5 - 17.5 g/dL    Hematocrit 29.9 (L) 40.5 - 52.5 %    MCV 93.5 80.0 - 100.0 fL    MCH 30.2 26.0 - 34.0 pg    MCHC 32.3 31.0 - 36.0 g/dL    RDW 17.2 (H) 12.4 - 15.4 %    Platelets 389 (L) 497 - 450 K/uL    MPV 8.8 5.0 - 10.5 fL    Neutrophils % 81.4 %    Lymphocytes % 9.4 %    Monocytes % 7.7 %    Eosinophils % 0.9 %    Basophils % 0.6 %    Neutrophils # 3.5 1.7 - 7.7 K/uL    Lymphocytes # 0.4 (L) 1.0 - 5.1 K/uL    Monocytes # 0.3 0.0 - 1.3 K/uL    Eosinophils # 0.0 0.0 - 0.6 K/uL    Basophils # 0.0 0.0 - 0.2 K/uL   Comprehensive Metabolic Panel   Result Value Ref Range    Sodium 142 136 - 145 mmol/L    Potassium 4.3 3.5 - 5.1 mmol/L    Chloride 105 99 - 110 mmol/L    CO2 21 21 - 32 mmol/L    Anion Gap 16 3 - 16    Glucose 174 (H) 70 - 99 mg/dL    BUN 35 (H) 7 - 20 mg/dL    CREATININE 2.0 (H) 0.8 - 1.3 mg/dL    GFR Non- 31 (A) >60    GFR  37 (A) >60    Calcium 9.0 8.3 - 10.6 mg/dL    Total Protein 6.8 6.4 - 8.2 g/dL    Alb 3.4 3.4 - 5.0 g/dL    Albumin/Globulin Ratio 1.0 (L) 1.1 - 2.2    Total Bilirubin 0.8 0.0 - 1.0 mg/dL    Alkaline Phosphatase 67 40 - 129 U/L    ALT 15 10 - 40 U/L    AST 21 15 - 37 U/L    Globulin 3.4 g/dL   Lipase   Result Value Ref Range    Lipase 13.0 13.0 - 60.0 U/L   Lactic Acid, Plasma   Result Value Ref Range    Lactic Acid 2.9 (H) 0.4 - 2.0 mmol/L   Protime-INR   Result Value Ref Range    Protime 11.8 9.8 - 13.0 sec    INR 1.04 0.86 - 1.14   APTT   Result Value Ref Range    aPTT 32.6 26.0 - review. Dose modulation, iterative reconstruction, and/or weight based adjustment of  the mA/kV was utilized to reduce the radiation dose to as low as reasonably  achievable. COMPARISON:  None. HISTORY:  ORDERING SYSTEM PROVIDED HISTORY: n.v.d. PO CON PLEASE  TECHNOLOGIST PROVIDED HISTORY:  Additional Contrast?->Oral  Ordering Physician Provided Reason for Exam: n/v/d  Acuity: Acute  Type of Exam: Initial    FINDINGS:  Lower Chest: Coronary artery calcification.  Trace pleural effusions. Basilar atelectasis.  Micronodular pattern is seen within the right middle  lobe. Organs: Lack of IV contrast limits sensitivity for visceral organ pathology. Oral contrast was given. Patient is status post cholecystectomy. 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 Calcified granulomas within the spleen.  Duodenal diverticulum.  Atrophy of  the pancreas.  Soft tissue mass is seen at the upper abdominal midline,  obscuring the celiac artery, measuring approximately 4.0 x 4.2 cm.  Adrenal  glands are within normal limits.  Left renal cortical thinning.  No  hydronephrosis.  Nonobstructing right nephrolithiasis.  Small hiatal hernia. Calcification of the abdominal aorta and iliac arteries. GI/Bowel: Moderate ascites is seen within the abdomen and pelvis, measuring  Hounsfield units 12 within the pelvis.  Induration is seen of mesenteric fat. Diverticulosis. Thea Simmers is seen within the appendix.  Small bowel loops are  nondilated.  Mural thickening is seen of small and large bowel, nonspecific  in the setting of ascites. Pelvis: Calcification of prostate.  Prostatomegaly.  Fluid is seen within the  left inguinal canal.    Peritoneum/Retroperitoneum: No free air.     Bones/Soft Tissues: Streak artifact is seen from intramedullary maria teresa within  the proximal right femur.  Vacuum disc phenomenon at L4-L5.  Trace  anterolisthesis of L4 on L5.              EKG (if obtained): (All EKG's are interpreted by myself in the absence of a cardiologist)  Initial EKG on my interpretation shows normal sinus rhythm with rate of 65 bpm with right bundle branch block. No ST segment elevation    MDM:  Differential diagnosis: SBO, dehydration, diarrhea    Cbc shows mild anemia  LA 2.9  CMP shows SRINIVASA  IVF started    CTAP with by mouth contrast shows a mass around the pancreas concerning for malignancy. I admitted the patient and family aware of that. There is also evidence of pneumobilia in his abdomen although (he is surgically status post cholecystectomy remotely). I discussed case Dr. Mojgan Jennings who states this may be remotely from ERCP and does not recommend surgical intervention this time. Patient admitted to hospitalist    Old records reviewed. Labs and imaging reviewed and results discussed with patient        Patient was given scripts for the following medications. I counseled patient how to take these medications. New Prescriptions    No medications on file         CRITICAL CARE TIME   Total Critical Care time was 0 minutes, excluding separately reportable procedures. There was a high probability of clinically significant/life threatening deterioration in the patient's condition which required my urgent intervention.       Clinical Impression:  Dehydration, acute kidney injury, pneumobili, fatou-pancreatic mass  (Please note that portions of this note may have been completed with a voice recognition program. Efforts were made to edit the dictations but occasionally words are mis-transcribed.)    MD Braden Chandler MD  06/04/19 1773

## 2019-06-05 LAB
ANION GAP SERPL CALCULATED.3IONS-SCNC: 8 MMOL/L (ref 3–16)
ANISOCYTOSIS: ABNORMAL
BASOPHILS ABSOLUTE: 0 K/UL (ref 0–0.2)
BASOPHILS RELATIVE PERCENT: 0.6 %
BUN BLDV-MCNC: 32 MG/DL (ref 7–20)
C DIFF TOXIN/ANTIGEN: NORMAL
CALCIUM SERPL-MCNC: 8.5 MG/DL (ref 8.3–10.6)
CEA: 3.4 NG/ML (ref 0–5)
CHLORIDE BLD-SCNC: 108 MMOL/L (ref 99–110)
CO2: 25 MMOL/L (ref 21–32)
CREAT SERPL-MCNC: 1.8 MG/DL (ref 0.8–1.3)
EOSINOPHILS ABSOLUTE: 0 K/UL (ref 0–0.6)
EOSINOPHILS RELATIVE PERCENT: 1 %
GFR AFRICAN AMERICAN: 42
GFR NON-AFRICAN AMERICAN: 35
GLUCOSE BLD-MCNC: 115 MG/DL (ref 70–99)
HCT VFR BLD CALC: 26.4 % (ref 40.5–52.5)
HEMOGLOBIN: 8.7 G/DL (ref 13.5–17.5)
LYMPHOCYTES ABSOLUTE: 0.4 K/UL (ref 1–5.1)
LYMPHOCYTES RELATIVE PERCENT: 11.8 %
MCH RBC QN AUTO: 30.1 PG (ref 26–34)
MCHC RBC AUTO-ENTMCNC: 32.9 G/DL (ref 31–36)
MCV RBC AUTO: 91.3 FL (ref 80–100)
MONOCYTES ABSOLUTE: 0.3 K/UL (ref 0–1.3)
MONOCYTES RELATIVE PERCENT: 7.8 %
NEUTROPHILS ABSOLUTE: 2.9 K/UL (ref 1.7–7.7)
NEUTROPHILS RELATIVE PERCENT: 78.8 %
OVALOCYTES: ABNORMAL
PDW BLD-RTO: 17.8 % (ref 12.4–15.4)
PLATELET # BLD: 99 K/UL (ref 135–450)
PLATELET SLIDE REVIEW: ABNORMAL
PMV BLD AUTO: 8.8 FL (ref 5–10.5)
POIKILOCYTES: ABNORMAL
POTASSIUM REFLEX MAGNESIUM: 4.3 MMOL/L (ref 3.5–5.1)
RBC # BLD: 2.89 M/UL (ref 4.2–5.9)
SODIUM BLD-SCNC: 141 MMOL/L (ref 136–145)
WBC # BLD: 3.7 K/UL (ref 4–11)
WHITE BLOOD CELLS (WBC), STOOL: ABNORMAL

## 2019-06-05 PROCEDURE — 87449 NOS EACH ORGANISM AG IA: CPT

## 2019-06-05 PROCEDURE — 6370000000 HC RX 637 (ALT 250 FOR IP): Performed by: INTERNAL MEDICINE

## 2019-06-05 PROCEDURE — 87324 CLOSTRIDIUM AG IA: CPT

## 2019-06-05 PROCEDURE — 83630 LACTOFERRIN FECAL (QUAL): CPT

## 2019-06-05 PROCEDURE — 87046 STOOL CULTR AEROBIC BACT EA: CPT

## 2019-06-05 PROCEDURE — 94760 N-INVAS EAR/PLS OXIMETRY 1: CPT

## 2019-06-05 PROCEDURE — 6370000000 HC RX 637 (ALT 250 FOR IP): Performed by: NURSE PRACTITIONER

## 2019-06-05 PROCEDURE — 87505 NFCT AGENT DETECTION GI: CPT

## 2019-06-05 PROCEDURE — 51798 US URINE CAPACITY MEASURE: CPT

## 2019-06-05 PROCEDURE — 80048 BASIC METABOLIC PNL TOTAL CA: CPT

## 2019-06-05 PROCEDURE — 87493 C DIFF AMPLIFIED PROBE: CPT

## 2019-06-05 PROCEDURE — 94640 AIRWAY INHALATION TREATMENT: CPT

## 2019-06-05 PROCEDURE — 1200000000 HC SEMI PRIVATE

## 2019-06-05 PROCEDURE — 2580000003 HC RX 258: Performed by: INTERNAL MEDICINE

## 2019-06-05 PROCEDURE — 85025 COMPLETE CBC W/AUTO DIFF WBC: CPT

## 2019-06-05 PROCEDURE — 36415 COLL VENOUS BLD VENIPUNCTURE: CPT

## 2019-06-05 RX ORDER — MORPHINE SULFATE 10 MG/.5ML
2.5 SOLUTION ORAL EVERY 4 HOURS PRN
Status: DISCONTINUED | OUTPATIENT
Start: 2019-06-05 | End: 2019-06-06 | Stop reason: HOSPADM

## 2019-06-05 RX ORDER — LACTOBACILLUS RHAMNOSUS GG 10B CELL
1 CAPSULE ORAL 2 TIMES DAILY WITH MEALS
Status: DISCONTINUED | OUTPATIENT
Start: 2019-06-05 | End: 2019-06-06 | Stop reason: HOSPADM

## 2019-06-05 RX ORDER — LORAZEPAM 2 MG/ML
0.5 CONCENTRATE ORAL EVERY 4 HOURS PRN
Status: DISCONTINUED | OUTPATIENT
Start: 2019-06-05 | End: 2019-06-06 | Stop reason: HOSPADM

## 2019-06-05 RX ORDER — SODIUM CHLORIDE 9 MG/ML
INJECTION, SOLUTION INTRAVENOUS ONCE
Status: COMPLETED | OUTPATIENT
Start: 2019-06-05 | End: 2019-06-05

## 2019-06-05 RX ADMIN — IPRATROPIUM BROMIDE 2 PUFF: 17 AEROSOL, METERED RESPIRATORY (INHALATION) at 20:26

## 2019-06-05 RX ADMIN — Medication 1 CAPSULE: at 18:17

## 2019-06-05 RX ADMIN — LATANOPROST 1 DROP: 50 SOLUTION/ DROPS OPHTHALMIC at 01:15

## 2019-06-05 RX ADMIN — ALBUTEROL SULFATE 2 PUFF: 90 AEROSOL, METERED RESPIRATORY (INHALATION) at 13:14

## 2019-06-05 RX ADMIN — ATENOLOL 25 MG: 25 TABLET ORAL at 08:30

## 2019-06-05 RX ADMIN — ALBUTEROL SULFATE 2 PUFF: 90 AEROSOL, METERED RESPIRATORY (INHALATION) at 07:53

## 2019-06-05 RX ADMIN — ALBUTEROL SULFATE 2 PUFF: 90 AEROSOL, METERED RESPIRATORY (INHALATION) at 01:20

## 2019-06-05 RX ADMIN — TAMSULOSIN HYDROCHLORIDE 0.4 MG: 0.4 CAPSULE ORAL at 08:30

## 2019-06-05 RX ADMIN — IPRATROPIUM BROMIDE 2 PUFF: 17 AEROSOL, METERED RESPIRATORY (INHALATION) at 01:20

## 2019-06-05 RX ADMIN — ALBUTEROL SULFATE 2 PUFF: 90 AEROSOL, METERED RESPIRATORY (INHALATION) at 20:26

## 2019-06-05 RX ADMIN — FAMOTIDINE 20 MG: 20 TABLET ORAL at 08:30

## 2019-06-05 RX ADMIN — SODIUM CHLORIDE: 9 INJECTION, SOLUTION INTRAVENOUS at 14:52

## 2019-06-05 RX ADMIN — IPRATROPIUM BROMIDE 2 PUFF: 17 AEROSOL, METERED RESPIRATORY (INHALATION) at 13:17

## 2019-06-05 RX ADMIN — LATANOPROST 1 DROP: 50 SOLUTION/ DROPS OPHTHALMIC at 23:46

## 2019-06-05 RX ADMIN — IPRATROPIUM BROMIDE 2 PUFF: 17 AEROSOL, METERED RESPIRATORY (INHALATION) at 07:56

## 2019-06-05 ASSESSMENT — PAIN SCALES - GENERAL
PAINLEVEL_OUTOF10: 0

## 2019-06-05 NOTE — H&P
Hospital Medicine  History and Physical    PCP: [facility] DO ABDIRAHMAN  Patient Name: Lamonte Hunt    Date of Service: Pt seen/examined on 06/04/2019 and Admitted to Inpatient with expected LOS greater than two midnights due to medical therapy    CHIEF COMPLAINT:  Pt c/o nausea, vomiting and diarrhea  HISTORY OF PRESENT ILLNESS: Patient is a poor historian at this time, and information for this report is derived from conversation with the emergency room physician, review of the records and from conversations with patient's family and the patient himself. Patient is a 60-year-old man with a history of hypertension, hyperlipidemia, coronary artery disease and chronic renal failure. He presents to the emergency department for evaluation following a three week history of diarrhea and a two week history of nausea and vomiting. His family is concerned that he may be dehydrated. In the emergency room he was found to have acute on chronic renal failure, an elevated lactic acid level without identification of an infection, and elevated troponin, possible small bowel and colonic wall thickening without evidence of obstruction and pneumobilia. He is being admitted for further evaluation and treatment. Associated signs and symptoms do not include fever or chills, nausea, vomiting, abdominal pain, hemoptysis, hematochezia, diarrhea, constipation or urinary symptoms. Past Medical History:        Diagnosis Date    Angina     BPH (benign prostatic hypertrophy)     CAD (coronary artery disease)     Glaucoma     Hyperlipidemia     Hypertension     Myocardial infarction ([facility] [address] 1998       Past Surgical History:        Procedure Laterality Date    CATARACT REMOVAL WITH IMPLANT  1990    bilateral    CHOLECYSTECTOMY  1984       Allergies:  Patient has no known allergies. Medications Prior to Admission:    Prior to Admission medications    Medication Sig Start Date End Date Taking?  Authorizing Provider   latanoprost (XALATAN) 0.005 % ophthalmic solution 1 drop nightly   Yes Historical Provider, MD   albuterol-ipratropium (COMBIVENT RESPIMAT)  MCG/ACT AERS inhaler Inhale 1 puff into the lungs every 6 hours 1/8/19  Yes Khris Seal, DO   tamsulosin (FLOMAX) 0.4 MG capsule TAKE 1 CAPSULE BY MOUTH EVERY DAY 10/22/18  Yes Khris Seal, DO   atenolol (TENORMIN) 25 MG tablet Take 25 mg by mouth daily. Yes Historical Provider, MD   Multiple Vitamin (MULTIVITAMIN PO) Take  by mouth daily. Historical Provider, MD   nitroGLYCERIN (NITROSTAT) 0.4 MG SL tablet Place 0.4 mg under the tongue every 5 minutes as needed. Historical Provider, MD       Family History:       Problem Relation Age of Onset    Cancer Son         esophagus    Tuberculosis Mother     Tuberculosis Father      Social History:   TOBACCO:   reports that he has quit smoking. He has never used smokeless tobacco.  ETOH:   reports that he drinks alcohol. OCCUPATION:      REVIEW OF SYSTEMS:  A full review of systems was performed and is negative except for that which appears in the HPI    Physical Exam:    Vitals: BP (!) 102/59   Pulse 66   Temp 97.7 °F (36.5 °C) (Oral)   Resp 16   Ht 5' 5\" (1.651 m)   Wt 129 lb 3 oz (58.6 kg)   SpO2 93%   BMI 21.50 kg/m²   General appearance: WD/WN 80y.o. year-old  male who is alert, appears stated age and is cooperative  HEENT: Head: Normocephalic, no lesions, without obvious abnormality. Eye: Normal external eye, conjunctiva, lids cornea, RASHEED. Ears: Normal external ears. Non-tender. Nose: Normal external nose, mucus membranes and septum. Pharynx: Dental Hygiene adequate. Normal buccal mucosa. Normal pharynx. Neck: no adenopathy, no carotid bruit, no JVD, supple, symmetrical, trachea midline and thyroid not enlarged, symmetric, no tenderness/mass/nodules  Lungs: clear to auscultation bilaterally and no use of accessory muscles.   Heart: regular rate and rhythm, S1, S2 normal, no murmur, COMPARISON: None. HISTORY: ORDERING SYSTEM PROVIDED HISTORY: n.v.d. PO CON PLEASE TECHNOLOGIST PROVIDED HISTORY: Additional Contrast?->Oral Ordering Physician Provided Reason for Exam: n/v/d Acuity: Acute Type of Exam: Initial FINDINGS: Lower Chest: Coronary artery calcification. Trace pleural effusions. Basilar atelectasis. Micronodular pattern is seen within the right middle lobe. Organs: Lack of IV contrast limits sensitivity for visceral organ pathology. Oral contrast was given. Patient is status post cholecystectomy. 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 Calcified granulomas within the spleen. Duodenal diverticulum. Atrophy of the pancreas. Soft tissue mass is seen at the upper abdominal midline, obscuring the celiac artery, measuring approximately 4.0 x 4.2 cm. Adrenal glands are within normal limits. Left renal cortical thinning. No hydronephrosis. Nonobstructing right nephrolithiasis. Small hiatal hernia. Calcification of the abdominal aorta and iliac arteries. GI/Bowel: Moderate ascites is seen within the abdomen and pelvis, measuring Hounsfield units 12 within the pelvis. Induration is seen of mesenteric fat. Diverticulosis. Air is seen within the appendix. Small bowel loops are nondilated. Mural thickening is seen of small and large bowel, nonspecific in the setting of ascites. Pelvis: Calcification of prostate. Prostatomegaly. Fluid is seen within the left inguinal canal. Peritoneum/Retroperitoneum: No free air. Bones/Soft Tissues: Streak artifact is seen from intramedullary maria teresa within the proximal right femur. Vacuum disc phenomenon at L4-L5. Trace anterolisthesis of L4 on L5.     4.2 x 4.0 cm soft tissue mass at the level of the celiac axis, concerning for adenopathy or pancreatic malignancy. Suggest correlation with any prior outside imaging.   When patient is able, contrast-enhanced exam would be suggested for further evaluation. Moderate ascites, mesenteric edema, and soft tissue anasarca. Mural thickening of small and large bowel which can be reactive secondary to ascites. Enterocolitis cannot be excluded in this setting. Small bilateral pleural effusions and bibasilar atelectasis. Micronodule infiltrate within the right middle lobe, suggestive of pneumonitis. Follow-up to resolution is recommended. 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 Correlate with surgical history. Duodenal diverticulum. Diverticulosis. Right nephrolithiasis. Assessment:   Principal Problem:    Nausea and vomiting  Active Problems:    Hyperlipidemia    CAD (coronary artery disease)    Hypertension    Diarrhea    Abdominal mass    Elevated troponin    Acute renal failure superimposed on stage 3 chronic kidney disease (HCC)    Elevated lactic acid level    Pneumobilia    Possible small bowel and colon wall thickening  Resolved Problems:    * No resolved hospital problems. *      Plan:     Abdominal mass, highly suspicious for cancer in a 80year old  - CT of the abdomen and pelvis with a \"4.2 x 4.0 cm soft tissue mass at the level of the celiac axis, concerning for adenopathy or pancreatic malignancy\"  - Family is pragmatic and their goals are to keep him comfortable and they do not want to pursue this aggressively, their main goal is comfort  - Family would not want a biopsy or further work up as they would not pursue further regardless of the findings  - Will limit work up to checking CA 19.9 and a CEA.  If findings are suggestive of possible Pancreatic Cancer, this could be responsible for his nausea and vomiting and impact treatment strategies to treat these   - Palliative Care consulted    Nausea and vomiting - will ask GI to evaluate and help with symptom management  - Will provide symptomatic treatment with Zofran as needed, maintenance of fluids and electrolytes    Diarrhea - Check stool for bacterial pathogens, fecal leukocytes and C diff (cliniclly low suspicion) and ask GI to evaluate     Acute renal failure superimposed on stage 3 chronic kidney disease ([address] - Likely secondary to poor intake, diarrhea and emesis. Will gently hydrate, consult Nephrology to help in his evaluation and treatment and avoid nephrotoxic agents. Elevated troponin - Pt denies current or recent chest pain; likely associated with reduced renal clearance. Will not follow serial troponins or monitor on telemetry as the family states that they would not pursue treatment if further testing indicated an underlying cardiac issue    CAD (coronary artery disease) - Pt denies chest pain. Continue Beta Blocker    Elevated lactic acid level - no infection identified     Pneumobilia and possible small bowel and colon wall thickening - Surgery has evaluated and there is no indication for intervention  - Per Surgery, \"I think the pneumobilia is most likely secondary to remote ERCP sphincterotomy or choledochoduodenal bypass during prior cholecystectomy, and is a chronic finding. No evidence of biliary obstruction or leukocytosis. No need for abx from a pneumobilia standpoint. \"    Essential (primary) hypertension - continue home meds and monitor blood pressure    Hyperlipidemia - No currently on statin therapy. Defer to PCP    Although not listed on his problem list, Pt appears to have some degree of dementia - Will provide supportive care. DVT Prophylaxis: SCDs  Diet: DIET GENERAL;  Code Status: DNR-CCA  (Advanced care planning has been discussed with patient and/or responsible family member and is reflected in the code status. Further orders associated with this have been entered if appropriate)    Disposition: Anticipate that patient will remain in the hospital for 2 to 3 days depending on further evaluation and clinical course.    - Palliative care consulted      Franki Yung

## 2019-06-05 NOTE — PLAN OF CARE
Problem: Falls - Risk of:  Goal: Will remain free from falls  Description  Will remain free from falls  Outcome: Ongoing  Goal: Absence of physical injury  Description  Absence of physical injury  Outcome: Ongoing     Problem: Risk for Impaired Skin Integrity  Goal: Tissue integrity - skin and mucous membranes  Description  Structural intactness and normal physiological function of skin and  mucous membranes.   Outcome: Ongoing     Problem: Infection:  Goal: Will remain free from infection  Description  Will remain free from infection  Outcome: Ongoing     Problem: Safety:  Goal: Free from accidental physical injury  Description  Free from accidental physical injury  Outcome: Ongoing  Goal: Free from intentional harm  Description  Free from intentional harm  Outcome: Ongoing     Problem: Daily Care:  Goal: Daily care needs are met  Description  Daily care needs are met  Outcome: Ongoing     Problem: Pain:  Goal: Patient's pain/discomfort is manageable  Description  Patient's pain/discomfort is manageable  Outcome: Ongoing     Problem: Skin Integrity:  Goal: Skin integrity will stabilize  Description  Skin integrity will stabilize  Outcome: Ongoing     Problem: Discharge Planning:  Goal: Patients continuum of care needs are met  Description  Patients continuum of care needs are met  Outcome: Ongoing

## 2019-06-05 NOTE — CONSULTS
Gastroenterology Consult Note      Patient: Ramiro Wyatt  : 1919  Acct#:      Date:  2019    Subjective:       History of Present Illness  Patient is a 80 y.o.  male who is seen in consult for pancreatic mass on CT. Patient was admitted with progressive abdominal pain. Daughter reports prodrome of weight loss and anorexia. No vomiting reported. No jaundice. Pain can move to back. No fever or chills. Non-smoker. He denies hx of pancreatitis. Occasional Etoh      Past Medical History:   Diagnosis Date    Angina     BPH (benign prostatic hypertrophy)     CAD (coronary artery disease)     Glaucoma     Hyperlipidemia     Hypertension     Myocardial infarction ([facility] [address]       Past Surgical History:   Procedure Laterality Date    CATARACT REMOVAL WITH IMPLANT      bilateral    CHOLECYSTECTOMY        Past Endoscopic History: None    Admission Meds  No current facility-administered medications on file prior to encounter. Current Outpatient Medications on File Prior to Encounter   Medication Sig Dispense Refill    latanoprost (XALATAN) 0.005 % ophthalmic solution 1 drop nightly      albuterol-ipratropium (COMBIVENT RESPIMAT)  MCG/ACT AERS inhaler Inhale 1 puff into the lungs every 6 hours 1 Inhaler 5    tamsulosin (FLOMAX) 0.4 MG capsule TAKE 1 CAPSULE BY MOUTH EVERY DAY 90 capsule 0    atenolol (TENORMIN) 25 MG tablet Take 25 mg by mouth daily.  Multiple Vitamin (MULTIVITAMIN PO) Take  by mouth daily.  nitroGLYCERIN (NITROSTAT) 0.4 MG SL tablet Place 0.4 mg under the tongue every 5 minutes as needed.              Allergies  No Known Allergies     Social history:  Social History     Tobacco Use    Smoking status: Former Smoker    Smokeless tobacco: Never Used   Substance Use Topics    Alcohol use: Yes     Comment: occasional use        Family History:   Family History   Problem Relation Age of Onset    Cancer Son         esophagus setting. Small bilateral pleural effusions and bibasilar atelectasis. Micronodule   infiltrate within the right middle lobe, suggestive of pneumonitis. Follow-up to resolution is recommended. 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 Correlate with surgical history. Duodenal diverticulum. Diverticulosis. Right nephrolithiasis. Assessment:   79 yo WM with progressive abdominal pain, anorexia, and weight loss admitted with:    1) Suspected metastatic pancreatic cancer. Patient with large 4cm mass in the body of the pancreas, involving the celiac artery, with ascites and significant adenopathy. Clinical picture is concerning for malignancy. Patient's family and patient do not want chemotherapy and further diagnostic workup. Patient appears unresectable in the setting of ascites (suspected malignant), adenopathy, involvement of celiac artery. 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 No signs of cholangitis. 3) Hypoalbuminemia and malnutrition  4) Colonic thickening- suspect related to low albumin and presence of ascites    Recommendations:   1)Spoke with daughter at length. 2) Agree with palliative care and hospice consult. 3) No biliary obstruction noted. No role for ERCP or EUS based on above. Thank you for allowing me to participate in the care of your patient. Please feel free to contact me with any questions or concerns.      Thalia Shaikh,   [address] and [address] [facility]

## 2019-06-05 NOTE — CONSULTS
[facility]                                                                                                                       Office : [phone]     Fax :[phone]       Nephrology Consult Note      Patient's Name: Callum Rodriguez  8:52 AM  6/5/2019    Reason for Consult:  Renal failure       Requesting Physician:  Chris Hendricks DO      Chief Complaint:    Chief Complaint   Patient presents with    Diarrhea     all s/s intermittent for approx. 1 month    Nausea & Vomiting         History of Present iIlness: Callum Rodriguez is a 80 y.o. male with h/o BPH, CKD 3, HTN was admitted with nausea and diarrhea   He is 80years old but is active . 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 I was consulted for his abnormal renal function   Has anemia   Has h/o pancreatic mass     Past Medical History:   Diagnosis Date    Angina     BPH (benign prostatic hypertrophy)     CAD (coronary artery disease)     Glaucoma     Hyperlipidemia     Hypertension     Myocardial infarction ([address] 1998       Past Surgical History:   Procedure Laterality Date    CATARACT REMOVAL WITH IMPLANT  1990    bilateral    CHOLECYSTECTOMY  1984       Family History   Problem Relation Age of Onset    Cancer Son         esophagus    Tuberculosis Mother     Tuberculosis Father         reports that he has quit smoking. He has never used smokeless tobacco. He reports that he drinks alcohol. He reports that he does not use drugs. Allergies:  Patient has no known allergies.     Current Medications:      tamsulosin (FLOMAX) capsule 0.4 mg Daily   latanoprost (XALATAN) 0.005 % ophthalmic solution 1 drop Nightly   atenolol (TENORMIN) tablet 25 mg Daily   sodium chloride flush 0.9 % injection 10 mL 2 times per day sodium chloride flush 0.9 % injection 10 mL PRN   ondansetron (ZOFRAN) injection 4 mg Q4H PRN   acetaminophen (TYLENOL) tablet 650 mg Q4H PRN   aluminum & magnesium hydroxide-simethicone (MAALOX) 200-200-20 MG/5ML suspension 30 mL Q6H PRN   albuterol sulfate  (90 Base) MCG/ACT inhaler 2 puff Q6H   And    ipratropium (ATROVENT HFA) 17 MCG/ACT inhaler 2 puff Q6H   famotidine (PEPCID) tablet 20 mg Daily       Review of Systems:   14 point ROS obtained but were negative except mentioned in HPI      Physical exam:     Vitals:  BP (!) 148/68   Pulse 69   Temp 97.8 °F (36.6 °C) (Oral)   Resp 19   Ht 5' 5\" (1.651 m)   Wt 129 lb 6.6 oz (58.7 kg)   SpO2 92%   BMI 21.53 kg/m²   Constitutional:  OAA X 2  Skin: no rash, turgor wnl  Heent:  eomi, mmm  Neck: no bruits or jvd noted  Cardiovascular:  S1, S2 without m/r/g  Respiratory: CTA B without w/r/r  Abdomen:  +bs, soft, nt, nd  Ext: no  lower extremity edema  Psychiatric: mood and affect appropriate  Musculoskeletal:  Rom, muscular strength intact    Labs:  CBC:   Recent Labs     06/04/19  1431 06/05/19  0800   WBC 4.3 3.7*   HGB 9.7* 8.7*   *  --      BMP:  Recent Labs     06/04/19  1431 06/05/19  0800    141   K 4.3 4.3    108   CO2 21 25   BUN 35* 32*   CREATININE 2.0* 1.8*   GLUCOSE 174* 115*     Ca/Mg/Phos: Recent Labs     06/04/19  1431 06/05/19  0800   CALCIUM 9.0 8.5     Hepatic: Recent Labs     06/04/19  1431   AST 21   ALT 15   BILITOT 0.8   ALKPHOS 67     Troponin:   Recent Labs     06/04/19  1431   TROPONINI 0.04*     BNP: No results for input(s): BNP in the last 72 hours. Lipids: No results for input(s): CHOL, TRIG, HDL, LDLCALC, LABVLDL in the last 72 hours. ABGs: No results for input(s): PHART, PO2ART, DHS0INE in the last 72 hours.   INR:   Recent Labs     06/04/19  1431   INR 1.04     UA:  Recent Labs     06/04/19  [address] Negative   BILIRUBINUR Negative   KETUA Negative   Bhargavi Counts 1. 020   BLOODU Negative   PHUR 5.5   PROTEINU TRACE*   UROBILINOGEN 0.2   NITRU Negative   LEUKOCYTESUR Negative   LABMICR YES   URINETYPE Not Specified      Urine Microscopic: Recent Labs     06/04/19  1832   HYALCAST 2   WBCUA 1   RBCUA 2   EPIU 0     Urine Culture: No results for input(s): LABURIN in the last 72 hours. Urine Chemistry: No results for input(s): Coralyn Aplington, PROTEINUR, NAUR in the last 72 hours. IMAGING:  CT ABDOMEN PELVIS WO CONTRAST Additional Contrast? Oral   Final Result   4.2 x 4.0 cm soft tissue mass at the level of the celiac axis, concerning for   adenopathy or pancreatic malignancy. Suggest correlation with any prior   outside imaging. When patient is able, contrast-enhanced exam would be   suggested for further evaluation. Moderate ascites, mesenteric edema, and soft tissue anasarca. Mural thickening of small and large bowel which can be reactive secondary to   ascites. Enterocolitis cannot be excluded in this setting. Small bilateral pleural effusions and bibasilar atelectasis. Micronodule   infiltrate within the right middle lobe, suggestive of pneumonitis. Follow-up to resolution is recommended. 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 Correlate with surgical history. Duodenal diverticulum. Diverticulosis. Right nephrolithiasis. I/O last 3 completed shifts:  In: -   Out: 250 [Urine:250]          Assessment/Plan :      1. CKD stage 3 .  His baseline creatine is 1.8   His creatine has been fluctuating b/w 1.5 ---- > 2.1  He received iv fluids   Renal function is stable     If oral  intake not good then start NS at 50 ml/ hr   No intervention for renal ds needed at this time   Monitor renal function   Recommend to dose adjust all medications  based on renal functions  AVOID NSAIDs  Avoid Nephrotoxins  Monitor I/O                  Thank you for allowing us to participate in care of Fermin Tee         Electronically signed by: Antony Leavitt MD, 6/5/2019, 8:52 AM      [facility] [address]  Office : [phone]  Fax :[phone]

## 2019-06-05 NOTE — CONSULTS
Ht 5' 5\" (1.651 m)   Wt 129 lb 6.6 oz (58.7 kg)   SpO2 94%   BMI 21.53 kg/m²     Code Status: DNR-CCA    Advanced Directives: completed     Assessment        Management and Education    Persons available for education:       [x] Self     [x] Caregiver       [] Spouse       [x] Other Family Member   []  Other    Spiritual History:  notified: Yes,     Does the patient have a Primary Care Physician? Yes    Level of patient/caregiver understanding able to:        [x] Verbalize Understanding   [] Demonstrate Understanding       [] Teach Back       [] Needs Reinforcement     []  Other:      Teaching Time:  0hours  30 minutes     Plan        Social Service Consult Made:  Yes  Assistance filling out Living Will/HPOA:  No      Discharge Plan:  Providing support for coping/adaptation/distress of family  Providing support for coping/adaptation/distress of patient  Clarification of medical condition to patient and family  Code status clarified: [facility]  Palliative care orders introduced  Provided information about hospice    Discharge Environment:  [x] Hospice Consult Agency: List provided chose [facility] and Palliative care   [x] Home with Hospice Care     Discussion: Patient up in chair feeling better. His son and daughter are at bedside. He lives with his son is able to walk with walker, he has someone with him around the clock. He was found to have pancreatic mass they have decided they do not want work up or any treatment. We have discussed going home with hospice care and they agree they would like hospice. Referral to [facility] and Palliative care. HANDY Knowles-NP informed of above orders noted. I will continue to follow Mr. Rdz's care as needed. Thank you for allowing me to participate in the care of Mr. Rdz .      Electronically signed by Mira Oreilly RN, [address] [facility] on 6/5/2019 at 3:31 PM  [address] [place] [address]  Office: [phone]

## 2019-06-06 ENCOUNTER — TELEPHONE (OUTPATIENT)
Dept: FAMILY MEDICINE CLINIC | Age: 84
End: 2019-06-06

## 2019-06-06 VITALS
SYSTOLIC BLOOD PRESSURE: 138 MMHG | TEMPERATURE: 97.4 F | WEIGHT: 131.17 LBS | DIASTOLIC BLOOD PRESSURE: 68 MMHG | HEIGHT: 65 IN | BODY MASS INDEX: 21.85 KG/M2 | OXYGEN SATURATION: 94 % | HEART RATE: 62 BPM | RESPIRATION RATE: 18 BRPM

## 2019-06-06 LAB
ANION GAP SERPL CALCULATED.3IONS-SCNC: 11 MMOL/L (ref 3–16)
BASOPHILS ABSOLUTE: 0 K/UL (ref 0–0.2)
BASOPHILS RELATIVE PERCENT: 0.8 %
BUN BLDV-MCNC: 32 MG/DL (ref 7–20)
CA 19-9: 175 U/ML (ref 0–35)
CALCIUM SERPL-MCNC: 8.3 MG/DL (ref 8.3–10.6)
CHLORIDE BLD-SCNC: 108 MMOL/L (ref 99–110)
CLOSTRIDIUM DIFFICILE DNA AMPLIFICATION: NORMAL
CO2: 21 MMOL/L (ref 21–32)
CREAT SERPL-MCNC: 1.8 MG/DL (ref 0.8–1.3)
EOSINOPHILS ABSOLUTE: 0.1 K/UL (ref 0–0.6)
EOSINOPHILS RELATIVE PERCENT: 1.9 %
GFR AFRICAN AMERICAN: 42
GFR NON-AFRICAN AMERICAN: 35
GI BACTERIAL PATHOGENS BY PCR: NORMAL
GLUCOSE BLD-MCNC: 111 MG/DL (ref 70–99)
HCT VFR BLD CALC: 26.6 % (ref 40.5–52.5)
HEMOGLOBIN: 8.7 G/DL (ref 13.5–17.5)
LYMPHOCYTES ABSOLUTE: 0.4 K/UL (ref 1–5.1)
LYMPHOCYTES RELATIVE PERCENT: 12.7 %
MCH RBC QN AUTO: 29.9 PG (ref 26–34)
MCHC RBC AUTO-ENTMCNC: 32.8 G/DL (ref 31–36)
MCV RBC AUTO: 91.2 FL (ref 80–100)
MONOCYTES ABSOLUTE: 0.2 K/UL (ref 0–1.3)
MONOCYTES RELATIVE PERCENT: 7.2 %
NEUTROPHILS ABSOLUTE: 2.4 K/UL (ref 1.7–7.7)
NEUTROPHILS RELATIVE PERCENT: 77.4 %
PDW BLD-RTO: 17.4 % (ref 12.4–15.4)
PLATELET # BLD: 96 K/UL (ref 135–450)
PMV BLD AUTO: 9.2 FL (ref 5–10.5)
POTASSIUM REFLEX MAGNESIUM: 4.2 MMOL/L (ref 3.5–5.1)
RBC # BLD: 2.91 M/UL (ref 4.2–5.9)
SODIUM BLD-SCNC: 140 MMOL/L (ref 136–145)
WBC # BLD: 3.1 K/UL (ref 4–11)

## 2019-06-06 PROCEDURE — 6370000000 HC RX 637 (ALT 250 FOR IP): Performed by: INTERNAL MEDICINE

## 2019-06-06 PROCEDURE — 36415 COLL VENOUS BLD VENIPUNCTURE: CPT

## 2019-06-06 PROCEDURE — 51798 US URINE CAPACITY MEASURE: CPT

## 2019-06-06 PROCEDURE — 80048 BASIC METABOLIC PNL TOTAL CA: CPT

## 2019-06-06 PROCEDURE — 94640 AIRWAY INHALATION TREATMENT: CPT

## 2019-06-06 PROCEDURE — 6370000000 HC RX 637 (ALT 250 FOR IP): Performed by: NURSE PRACTITIONER

## 2019-06-06 PROCEDURE — 94760 N-INVAS EAR/PLS OXIMETRY 1: CPT

## 2019-06-06 PROCEDURE — 85025 COMPLETE CBC W/AUTO DIFF WBC: CPT

## 2019-06-06 RX ORDER — SCOLOPAMINE TRANSDERMAL SYSTEM 1 MG/1
1 PATCH, EXTENDED RELEASE TRANSDERMAL
Qty: 3 PATCH | Refills: 0 | Status: SHIPPED | OUTPATIENT
Start: 2019-06-06 | End: 2019-06-15

## 2019-06-06 RX ORDER — LORAZEPAM 2 MG/ML
0.5 CONCENTRATE ORAL EVERY 4 HOURS PRN
Qty: 6 ML | Refills: 0 | Status: SHIPPED | OUTPATIENT
Start: 2019-06-06 | End: 2019-06-09

## 2019-06-06 RX ORDER — MORPHINE SULFATE 10 MG/.5ML
2.5 SOLUTION ORAL EVERY 4 HOURS PRN
Qty: 30 ML | Refills: 0 | Status: SHIPPED | OUTPATIENT
Start: 2019-06-06 | End: 2019-06-09

## 2019-06-06 RX ORDER — ONDANSETRON 4 MG/1
4 TABLET, ORALLY DISINTEGRATING ORAL EVERY 8 HOURS PRN
Qty: 9 TABLET | Refills: 0 | Status: SHIPPED | OUTPATIENT
Start: 2019-06-06 | End: 2019-06-09

## 2019-06-06 RX ADMIN — ATENOLOL 25 MG: 25 TABLET ORAL at 09:12

## 2019-06-06 RX ADMIN — FAMOTIDINE 20 MG: 20 TABLET ORAL at 09:12

## 2019-06-06 RX ADMIN — TAMSULOSIN HYDROCHLORIDE 0.4 MG: 0.4 CAPSULE ORAL at 09:12

## 2019-06-06 RX ADMIN — IPRATROPIUM BROMIDE 2 PUFF: 17 AEROSOL, METERED RESPIRATORY (INHALATION) at 12:33

## 2019-06-06 RX ADMIN — ALBUTEROL SULFATE 2 PUFF: 90 AEROSOL, METERED RESPIRATORY (INHALATION) at 12:30

## 2019-06-06 RX ADMIN — ALBUTEROL SULFATE 2 PUFF: 90 AEROSOL, METERED RESPIRATORY (INHALATION) at 00:36

## 2019-06-06 RX ADMIN — IPRATROPIUM BROMIDE 2 PUFF: 17 AEROSOL, METERED RESPIRATORY (INHALATION) at 08:48

## 2019-06-06 RX ADMIN — ALBUTEROL SULFATE 2 PUFF: 90 AEROSOL, METERED RESPIRATORY (INHALATION) at 08:45

## 2019-06-06 RX ADMIN — Medication 1 CAPSULE: at 09:16

## 2019-06-06 RX ADMIN — MORPHINE SULFATE 2.5 MG: 100 SOLUTION ORAL at 12:45

## 2019-06-06 RX ADMIN — IPRATROPIUM BROMIDE 2 PUFF: 17 AEROSOL, METERED RESPIRATORY (INHALATION) at 00:36

## 2019-06-06 ASSESSMENT — PAIN SCALES - GENERAL: PAINLEVEL_OUTOF10: 0

## 2019-06-06 NOTE — TELEPHONE ENCOUNTER
KAMRAN w/ Hospice     Pt is in [facility] right now and the hospitalist  ist is the one who ordered hospice, the found an abdominal mass (thinks that it is cancer) and they want to keep the pt comfortable. KAMRAN is needing to know if you would be willing to sign the certification? Pl advise.      [phone]

## 2019-06-06 NOTE — PROGRESS NOTES
4 Eyes Skin Assessment     The patient is being assess for: Admission Skin Assessment  I agree that 2 RN's have performed a thorough Head to Toe Skin Assessment on the patient. ALL assessment sites listed below have been assessed.        Areas assessed by both nurses: yes   [x]   Head, Face, and Ears   [x]   Shoulders, Back, and Chest  [x]   Arms, Elbows, and Hands   [x]   Coccyx, Sacrum, and IschIum  [x]   Legs, Feet, and Heels        Does the Patient have Skin Breakdown?  no         Fili Prevention initiated:  Yes   Wound Care Orders initiated:  Yes   [id number]  nurse consulted for Pressure Injury (Stage 3,4, Unstageable, DTI, NWPT, and Complex wounds), New and Established Ostomies:  no      Nurse 1 eSignature: Electronically signed by April Lyons RN on 6/5/19 at 7:07 AM    **SHARE this note so that the co-signing nurse is able to place an eSignature**    Nurse 2 eSignature: Electronically signed by Love Boss RN on 6/5/19 at 12:28 PM
Clinical Pharmacy Note  Renal Dose Adjustment    Stephanie Rdz is receiving Famotidine. This medication is renally eliminated. Based on the patient's Estimated Creatinine Clearance: 17 mL/min (A) (based on SCr of 2 mg/dL (H)). and urine output, the dose has been adjusted to 20mg daily per protocol. Pharmacy will continue to monitor and adjust dose as needed for changes in renal function.
D: Pt. Up to bedside commode x2 but was not able to urinate  A: Bladder Scanned for 240, will monitor  R: Pt.  Was able to void 150 ml urine @ 2am
Patient is AAO. RR with dyspnea on exertion. O2Sat's within mid 90's on RA. Other VS WDL. Patient tolerated breakfast  With no issues. Care provided; T&Rp q 2 hrs and as needed. Family is at bedside. Fall prec in place. Call light within reach.  Electronically signed by Juan Miguel Palmer RN on 6/6/2019 at 12:26 PM
Patient tolerates meals; small portions each time. Denies nausea/vomiting. VSS. Noted with frequent urination. Bladder scan - zero at this time. Family at bedside.  Electronically signed by Nadeem Patrick RN on 6/5/2019 at 2:24 PM
Pt arrived to unit. Vitals are stable. Oriented Pt to room and call light. Visitors present in room. Ordered dinner for Pt. Will monitor.
Stool specimen sent to lab. Call received from [facility]: indeterminate for cdiff requiring further work up. Patient remains on contact isolations at this time.  Electronically signed by Maria Isabel Amaya RN on 6/5/2019 at 12:27 PM
Surgery    Discussed with ED. Reviewed labs and CT imaging. Patient with hx of cholecystectomy in 1984 now presents with nausea, vomiting, and diarrhea x 3 weeks. CT imaging shows pneumobilia and possible small bowel and colonic wall thickening without evidence of obstruction. I think the pneumobilia is most likely secondary to remote ERCP sphincterotomy or choledochoduodenal bypass during prior cholecystectomy, and is a chronic finding. No evidence of biliary obstruction or leukocytosis. No need for abx from a pneumobilia standpoint. Patient to be admitted to medicine for dehydration and diarrhea.     Nga Lobo MD
drop Both Eyes Nightly    atenolol  25 mg Oral Daily    sodium chloride flush  10 mL Intravenous 2 times per day    albuterol sulfate HFA  2 puff Inhalation Q6H    And    ipratropium  2 puff Inhalation Q6H    famotidine  20 mg Oral Daily     Continuous Infusions:  PRN Meds:.sodium chloride flush, ondansetron, acetaminophen, aluminum & magnesium hydroxide-simethicone    PHYSICAL EXAM:  BP (!) 148/68   Pulse 69   Temp 97.8 °F (36.6 °C) (Oral)   Resp 19   Ht 5' 5\" (1.651 m)   Wt 129 lb 6.6 oz (58.7 kg)   SpO2 92%   BMI 21.53 kg/m²       Intake/Output Summary (Last 24 hours) at 6/5/2019 1002  Last data filed at 6/5/2019 0200  Gross per 24 hour   Intake --   Output 250 ml   Net -250 ml       General: Alert and oriented. Resting in bed in no apparent distress. [place]. Family at bedside. HEENT: Normocephalic. Atraumatic. Pupils equal and reactive. EOM intact. Oral mucosa pink/moist/intact. Neck: Supple. Symmetrical. Trachea midline. Lungs: Clear to auscultation bilaterally. Respirations even and unlabored. Chest: Exam unremarkable. Cardiac: S1/S2 noted. Regular Rhythm and rate. Abdomen/GI: Soft. Non-tender. Non-distended. BS+. Extremities: PP+. Atraumatic. No redness/cyanosis/edema noted. Brisk cap refill. Skin: Dry and intact. No lesions noted. Neuro: Grossly intact. No focal deficits noted.      LABS:    Lab Results   Component Value Date    WBC 3.7 (L) 06/05/2019    HGB 8.7 (L) 06/05/2019    HCT 26.4 (L) 06/05/2019    MCV 91.3 06/05/2019    PLT 99 (L) 06/05/2019    LYMPHOPCT 11.8 06/05/2019    RBC 2.89 (L) 06/05/2019    MCH 30.1 06/05/2019    MCHC 32.9 06/05/2019    RDW 17.8 (H) 06/05/2019       Lab Results   Component Value Date    CREATININE 1.8 (H) 06/05/2019    BUN 32 (H) 06/05/2019     06/05/2019    K 4.3 06/05/2019     06/05/2019    CO2 25 06/05/2019       No results found for: MG    Lab Results   Component Value Date    ALT 15 06/04/2019    AST 21 06/04/2019    ALKPHOS 67 06/04/2019

## 2019-06-06 NOTE — PLAN OF CARE
Problem: Falls - Risk of:  Goal: Will remain free from falls  Description  Will remain free from falls  6/6/2019 1214 by Quique Delvalle RN  Outcome: Ongoing  6/6/2019 0542 by Nadeem Suggs RN  Outcome: Ongoing  Goal: Absence of physical injury  Description  Absence of physical injury  6/6/2019 1214 by Quique Delvalle RN  Outcome: Ongoing  6/6/2019 0542 by Nadeem Suggs RN  Outcome: Ongoing     Problem: Risk for Impaired Skin Integrity  Goal: Tissue integrity - skin and mucous membranes  Description  Structural intactness and normal physiological function of skin and  mucous membranes.   6/6/2019 1214 by Quique Delvalle RN  Outcome: Ongoing  6/6/2019 0542 by Nadeem Suggs RN  Outcome: Ongoing     Problem: Infection:  Goal: Will remain free from infection  Description  Will remain free from infection  6/6/2019 1214 by Quique Delvalle RN  Outcome: Ongoing  6/6/2019 0542 by Nadeem Suggs RN  Outcome: Ongoing     Problem: Safety:  Goal: Free from accidental physical injury  Description  Free from accidental physical injury  6/6/2019 1214 by Quique Delvalle RN  Outcome: Ongoing  6/6/2019 0542 by Nadeem Suggs RN  Outcome: Ongoing  Goal: Free from intentional harm  Description  Free from intentional harm  6/6/2019 1214 by Quique Delvalle RN  Outcome: Ongoing  6/6/2019 0542 by Nadeem Suggs RN  Outcome: Ongoing     Problem: Daily Care:  Goal: Daily care needs are met  Description  Daily care needs are met  6/6/2019 1214 by Quique Delvalle RN  Outcome: Ongoing  6/6/2019 0542 by Nadeem Suggs RN  Outcome: Ongoing     Problem: Pain:  Goal: Patient's pain/discomfort is manageable  Description  Patient's pain/discomfort is manageable  6/6/2019 1214 by Quique Delvalle RN  Outcome: Ongoing  6/6/2019 0542 by Nadeem Suggs RN  Outcome: Ongoing     Problem: Skin Integrity:  Goal: Skin integrity will stabilize  Description  Skin integrity will stabilize  6/6/2019 1214 by Quique Delvalle RN  Outcome: Ongoing  6/6/2019 0542 by Nadeem Suggs RN  Outcome: Ongoing     Problem: Discharge Planning:  Goal: Patients continuum of care needs are met  Description  Patients continuum of care needs are met  6/6/2019 1214 by Antonio Bishop RN  Outcome: Ongoing  6/6/2019 0542 by Herson Abdalla RN  Outcome: Ongoing

## 2019-06-06 NOTE — DISCHARGE SUMMARY
Hospital Medicine Discharge Summary      Patient ID: Venita Morris      Patient's PCP: Louie Quintana DO    Admit Date: 6/4/2019     Discharge Date: 6/6/19    Admitting Physician: Maria Luisa Brooke MD     Discharge Provider: HANDY Hutton NP     Discharge Diagnoses: Active Hospital Problems    Diagnosis Date Noted    Nausea and vomiting [R11.2] 06/04/2019    Diarrhea [R19.7] 06/04/2019    Abdominal mass [R19.00] 06/04/2019    Elevated troponin [R74.8] 06/04/2019    Acute renal failure superimposed on stage 3 chronic kidney disease ([facility] [address] [N17.9, N18.3] 06/04/2019    Elevated lactic acid level [R79.89] 06/04/2019    Pneumobilia [K83.8] 06/04/2019    Possible small bowel and colon wall thickening [K63.9] 06/04/2019    Hypertension [I10]     Hyperlipidemia [E78.5]     CAD (coronary artery disease) [I25.10]      Disposition:  [x] Home  [] Home with home health [] Rehab [] Psych [] SNF  [] LTAC  [] Long term nursing home or group home [] Transfer to ICU  [] Transfer to PCU [] Other:    Discharge Instructions/Follow-up:      DC to home with hospice     PCP/SNF to follow up: As above    Discharge Condition: Terminal      Code Status:  DNR-CC     Activity: activity as tolerated    Diet: DIET GENERAL;     Discharge Medications:     Current Discharge Medication List           Details   Morphine Sulfate, Concentrate, 10 MG/0.5ML SOLN Take 0.125 mLs by mouth every 4 hours as needed (For breakthrough pain and/or air hunger) for up to 3 days. Qty: 30 mL, Refills: 0    Comments: Reduce doses taken as pain becomes manageable  Associated Diagnoses: Hospice care      LORazepam (ATIVAN) 2 MG/ML concentrated solution Take 0.25 mLs by mouth every 4 hours as needed (PRN anxiety) for up to 3 days.   Qty: 6 mL, Refills: 0    Associated Diagnoses: Hospice care      ondansetron (ZOFRAN ODT) 4 MG disintegrating tablet Take 1 tablet by mouth every 8 hours as needed for Nausea or Vomiting  Qty: 9 tablet, Refills: 0      scopolamine (TRANSDERM-SCOP, 1.5 MG,) transdermal patch Place 1 patch onto the skin every 72 hours for 9 days  Qty: 3 patch, Refills: 0              Details   latanoprost (XALATAN) 0.005 % ophthalmic solution 1 drop nightly      albuterol-ipratropium (COMBIVENT RESPIMAT)  MCG/ACT AERS inhaler Inhale 1 puff into the lungs every 6 hours  Qty: 1 Inhaler, Refills: 5      tamsulosin (FLOMAX) 0.4 MG capsule TAKE 1 CAPSULE BY MOUTH EVERY DAY  Qty: 90 capsule, Refills: 0      atenolol (TENORMIN) 25 MG tablet Take 25 mg by mouth daily. The patient was seen and examined on day of discharge and this discharge summary is in conjunction with any daily progress note from day of discharge. Hospital Course: The patient is an 80 yrs old Carilyn Love a past medical history significant for hypertension, hyperlipidemia, coronary artery disease and chronic renal failure, who presented to [facility] ER with for further evaluation of N/V/D.      He presented to the emergency department for evaluation following a three week history of diarrhea and a two week history of nausea and vomiting. In the emergency room he was found to have acute on chronic renal failure, an elevated lactic acid level without identification of an infection, and elevated troponin, possible small bowel and colonic wall thickening without evidence of obstruction and pneumobilia. Imaging also had findings of a soft tissue mass concerning for pancreatic malignancy. The patient was admitted for further workup and treatment. Nephrology was consulted for SRINIVASA on CKD. GI was consulted. Palliative care was consulted, as family did not wish for any aggressive intervention. The patient was changed to [facility] with the plan to DC with hospice. At this time, the patient is able to discharge to home with home hospice for further comfort measures.     Exam:     /68   Pulse 62   Temp 97.4 °F (36.3 °C) (Oral)   Resp 18   Ht 5' 5\" (1.651 m)   Wt 131 lb 2.8 oz (59.5 kg)   SpO2 94%   BMI 21.83 kg/m²     General: Alert and oriented. Resting in bed in no apparent distress. [place]. Family at bedside. HEENT: Normocephalic. Atraumatic. Pupils equal and reactive. EOM intact. Oral mucosa pink/moist/intact. Neck: Supple. Symmetrical. Trachea midline. Lungs: Clear to auscultation bilaterally. Respirations even and unlabored. Chest: Exam unremarkable. Cardiac: S1/S2 noted. Regular Rhythm and rate. Abdomen/GI: Soft. Non-tender. Non-distended. BS+. Extremities: PP+. Atraumatic. No redness/cyanosis/edema noted. Brisk cap refill. Skin: Dry and intact. No lesions noted. Neuro: Grossly intact. No focal deficits noted.        Consults:     IP CONSULT TO GENERAL SURGERY  IP CONSULT TO GI  IP CONSULT TO NEPHROLOGY  IP CONSULT TO PALLIATIVE CARE  IP CONSULT TO HOSPICE    Significant Diagnostic Studies:       CT ABDOMEN PELVIS WO CONTRAST Additional Contrast? Oral   Final Result   4.2 x 4.0 cm soft tissue mass at the level of the celiac axis, concerning for   adenopathy or pancreatic malignancy. Suggest correlation with any prior   outside imaging. When patient is able, contrast-enhanced exam would be   suggested for further evaluation. Moderate ascites, mesenteric edema, and soft tissue anasarca. Mural thickening of small and large bowel which can be reactive secondary to   ascites. Enterocolitis cannot be excluded in this setting. Small bilateral pleural effusions and bibasilar atelectasis. Micronodule   infiltrate within the right middle lobe, suggestive of pneumonitis. Follow-up to resolution is recommended. 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 Correlate with surgical history. Duodenal diverticulum. Diverticulosis. Right nephrolithiasis. Labs:  For convenience and continuity at follow-up the following most recent labs are provided:    CBC:    Lab Results

## 2019-06-07 ENCOUNTER — TELEPHONE (OUTPATIENT)
Dept: FAMILY MEDICINE CLINIC | Age: 84
End: 2019-06-07

## 2019-06-10 ENCOUNTER — TELEPHONE (OUTPATIENT)
Dept: FAMILY MEDICINE CLINIC | Age: 84
End: 2019-06-10

## 2019-06-10 NOTE — TELEPHONE ENCOUNTER
Gonzales Berman with [facility] called to let you know pt passed away Saturday 6/8/19.